# Patient Record
Sex: FEMALE | Race: WHITE | NOT HISPANIC OR LATINO | Employment: FULL TIME | URBAN - METROPOLITAN AREA
[De-identification: names, ages, dates, MRNs, and addresses within clinical notes are randomized per-mention and may not be internally consistent; named-entity substitution may affect disease eponyms.]

---

## 2017-01-05 ENCOUNTER — OFFICE VISIT (OUTPATIENT)
Dept: FAMILY MEDICINE CLINIC | Facility: CLINIC | Age: 62
End: 2017-01-05

## 2017-01-05 VITALS
OXYGEN SATURATION: 98 % | HEART RATE: 66 BPM | WEIGHT: 192 LBS | HEIGHT: 63 IN | SYSTOLIC BLOOD PRESSURE: 112 MMHG | DIASTOLIC BLOOD PRESSURE: 72 MMHG | BODY MASS INDEX: 34.02 KG/M2 | TEMPERATURE: 97.9 F

## 2017-01-05 DIAGNOSIS — J06.9 UPPER RESPIRATORY TRACT INFECTION, UNSPECIFIED TYPE: Primary | ICD-10-CM

## 2017-01-05 DIAGNOSIS — J01.00 SUBACUTE MAXILLARY SINUSITIS: ICD-10-CM

## 2017-01-05 PROCEDURE — 99213 OFFICE O/P EST LOW 20 MIN: CPT | Performed by: NURSE PRACTITIONER

## 2017-01-05 RX ORDER — AZITHROMYCIN 250 MG/1
TABLET, FILM COATED ORAL
Qty: 6 TABLET | Refills: 0 | Status: SHIPPED | OUTPATIENT
Start: 2017-01-05 | End: 2017-12-26

## 2017-01-05 NOTE — PROGRESS NOTES
Subjective   Idania Mccray is a 61 y.o. female.     History of Present Illness One week history of clear to cloudy nasal discharge, nasal congestion, ears are full, sinus pressure. No fever or cough. Denies SOB and wheezing. She has not taken any meds to treat this.     The following portions of the patient's history were reviewed and updated as appropriate: allergies, current medications, past family history, past medical history, past social history, past surgical history and problem list.    Review of Systems   Constitutional: Negative for fatigue, fever and unexpected weight change.   HENT: Positive for congestion, postnasal drip, rhinorrhea and sinus pressure. Negative for hearing loss, nosebleeds, sore throat, trouble swallowing and voice change.    Eyes: Negative for pain, discharge, redness and visual disturbance.   Respiratory: Positive for cough. Negative for chest tightness, shortness of breath and wheezing.    Cardiovascular: Negative for chest pain, palpitations and leg swelling.   Gastrointestinal: Negative for abdominal distention, abdominal pain, anal bleeding, blood in stool, constipation, diarrhea, nausea and vomiting.   Endocrine: Negative for cold intolerance, heat intolerance, polydipsia, polyphagia and polyuria.   Genitourinary: Negative for dysuria, flank pain, frequency and hematuria.   Musculoskeletal: Negative for arthralgias, gait problem, joint swelling and myalgias.   Skin: Negative for color change and rash.   Neurological: Negative for dizziness, tremors, seizures, syncope, speech difficulty, weakness, numbness and headaches.   Hematological: Negative.    Psychiatric/Behavioral: Negative.        Objective   Physical Exam   Constitutional: She is oriented to person, place, and time. She appears well-developed and well-nourished. No distress.   HENT:   Head: Normocephalic and atraumatic.   Right Ear: Tympanic membrane and external ear normal.   Left Ear: Tympanic membrane and external  ear normal.   Nose: Nose normal.   Mouth/Throat: Oropharynx is clear and moist. No oropharyngeal exudate.   Eyes: Conjunctivae are normal. Pupils are equal, round, and reactive to light. Right eye exhibits no discharge. Left eye exhibits no discharge. No scleral icterus.   Neck: Neck supple. No tracheal deviation present. No thyromegaly present.   Cardiovascular: Normal rate, regular rhythm and normal heart sounds.  Exam reveals no gallop and no friction rub.    No murmur heard.  Pulmonary/Chest: Effort normal and breath sounds normal. No respiratory distress. She has no wheezes.   Abdominal: Soft. Bowel sounds are normal. She exhibits no distension and no mass. There is no tenderness.   Musculoskeletal: She exhibits no edema or deformity.   Lymphadenopathy:     She has no cervical adenopathy.   Neurological: She is alert and oriented to person, place, and time. Coordination normal.   Skin: Skin is warm and dry. No rash noted. No erythema.   Psychiatric: She has a normal mood and affect. Her speech is normal and behavior is normal. Judgment and thought content normal.   Nursing note and vitals reviewed.      Assessment/Plan   Idania was seen today for sinusitis.    Diagnoses and all orders for this visit:    Upper respiratory tract infection, unspecified type  -     azithromycin (ZITHROMAX Z-SITA) 250 MG tablet; Take 2 tablets the first day, then 1 tablet daily for 4 days.    Subacute maxillary sinusitis    Symptomatic treatment. Follow up symptoms persist or worsen.

## 2017-03-01 ENCOUNTER — TELEPHONE (OUTPATIENT)
Dept: FAMILY MEDICINE CLINIC | Facility: CLINIC | Age: 62
End: 2017-03-01

## 2017-03-01 NOTE — TELEPHONE ENCOUNTER
----- Message from Brandon Duenas MD sent at 2/28/2017  5:27 PM EST -----  Contact: [phone]  Yes ok cmp lipids tsh  ----- Message -----     From: Dave Mann MA     Sent: 2/28/2017   9:54 AM       To: Brandon Duenas MD    She has an f/u appt on 3-21, do you want her to wait?    ----- Message -----     From: Raquel Santizo     Sent: 2/28/2017   9:10 AM       To: Dave Mann MA    PATIENT IS REQUESTING A CALL BACK FROM THE NURSE. PATIENT WOULD LIKE TO KNOW IF SHE CAN STILL RETURN FOR FASTING LABS WITHOUT AN APPOINTMENT (PT WAS SEEN IN NOVEMBER) TO GET HER REFILLS. MAY LEAVE A MESSAGE ON # ABOVE      Called informed pt.    MARILEE SANDERS

## 2017-03-16 ENCOUNTER — LAB (OUTPATIENT)
Dept: FAMILY MEDICINE CLINIC | Facility: CLINIC | Age: 62
End: 2017-03-16

## 2017-03-16 DIAGNOSIS — E78.01 FAMILIAL HYPERCHOLESTEROLEMIA: Primary | ICD-10-CM

## 2017-03-16 DIAGNOSIS — I10 ESSENTIAL HYPERTENSION: ICD-10-CM

## 2017-03-16 LAB
25(OH)D3 SERPL-MCNC: 32.9 NG/ML
ALBUMIN SERPL-MCNC: 4.2 G/DL (ref 3.2–4.8)
ALBUMIN/GLOB SERPL: 1.5 G/DL (ref 1.5–2.5)
ALP SERPL-CCNC: 92 U/L (ref 25–100)
ALT SERPL W P-5'-P-CCNC: 17 U/L (ref 7–40)
ANION GAP SERPL CALCULATED.3IONS-SCNC: 7 MMOL/L (ref 3–11)
ARTICHOKE IGE QN: 111 MG/DL (ref 0–130)
AST SERPL-CCNC: 16 U/L (ref 0–33)
BASOPHILS # BLD AUTO: 0.02 10*3/MM3 (ref 0–0.2)
BASOPHILS NFR BLD AUTO: 0.6 % (ref 0–1)
BILIRUB SERPL-MCNC: 0.6 MG/DL (ref 0.3–1.2)
BUN BLD-MCNC: 15 MG/DL (ref 9–23)
BUN/CREAT SERPL: 21.4 (ref 7–25)
CALCIUM SPEC-SCNC: 10.2 MG/DL (ref 8.7–10.4)
CHLORIDE SERPL-SCNC: 103 MMOL/L (ref 99–109)
CHOLEST SERPL-MCNC: 174 MG/DL (ref 0–200)
CO2 SERPL-SCNC: 30 MMOL/L (ref 20–31)
CREAT BLD-MCNC: 0.7 MG/DL (ref 0.6–1.3)
DEPRECATED RDW RBC AUTO: 46 FL (ref 37–54)
EOSINOPHIL # BLD AUTO: 0.05 10*3/MM3 (ref 0.1–0.3)
EOSINOPHIL NFR BLD AUTO: 1.4 % (ref 0–3)
ERYTHROCYTE [DISTWIDTH] IN BLOOD BY AUTOMATED COUNT: 14.2 % (ref 11.3–14.5)
GFR SERPL CREATININE-BSD FRML MDRD: 85 ML/MIN/1.73
GLOBULIN UR ELPH-MCNC: 2.8 GM/DL
GLUCOSE BLD-MCNC: 109 MG/DL (ref 70–100)
HCT VFR BLD AUTO: 38.2 % (ref 34.5–44)
HDLC SERPL-MCNC: 54 MG/DL (ref 40–60)
HGB BLD-MCNC: 12.4 G/DL (ref 11.5–15.5)
IMM GRANULOCYTES # BLD: 0.01 10*3/MM3 (ref 0–0.03)
IMM GRANULOCYTES NFR BLD: 0.3 % (ref 0–0.6)
LYMPHOCYTES # BLD AUTO: 1.17 10*3/MM3 (ref 0.6–4.8)
LYMPHOCYTES NFR BLD AUTO: 33.4 % (ref 24–44)
MCH RBC QN AUTO: 28.7 PG (ref 27–31)
MCHC RBC AUTO-ENTMCNC: 32.5 G/DL (ref 32–36)
MCV RBC AUTO: 88.4 FL (ref 80–99)
MONOCYTES # BLD AUTO: 0.35 10*3/MM3 (ref 0–1)
MONOCYTES NFR BLD AUTO: 10 % (ref 0–12)
NEUTROPHILS # BLD AUTO: 1.9 10*3/MM3 (ref 1.5–8.3)
NEUTROPHILS NFR BLD AUTO: 54.3 % (ref 41–71)
PLATELET # BLD AUTO: 164 10*3/MM3 (ref 150–450)
PMV BLD AUTO: 12.4 FL (ref 6–12)
POTASSIUM BLD-SCNC: 4.8 MMOL/L (ref 3.5–5.5)
PROT SERPL-MCNC: 7 G/DL (ref 5.7–8.2)
RBC # BLD AUTO: 4.32 10*6/MM3 (ref 3.89–5.14)
SODIUM BLD-SCNC: 140 MMOL/L (ref 132–146)
TRIGL SERPL-MCNC: 81 MG/DL (ref 0–150)
TSH SERPL DL<=0.05 MIU/L-ACNC: 0.01 MIU/ML (ref 0.35–5.35)
WBC NRBC COR # BLD: 3.5 10*3/MM3 (ref 3.5–10.8)

## 2017-03-16 PROCEDURE — 85025 COMPLETE CBC W/AUTO DIFF WBC: CPT | Performed by: FAMILY MEDICINE

## 2017-03-16 PROCEDURE — 36415 COLL VENOUS BLD VENIPUNCTURE: CPT | Performed by: FAMILY MEDICINE

## 2017-03-16 PROCEDURE — 82306 VITAMIN D 25 HYDROXY: CPT | Performed by: FAMILY MEDICINE

## 2017-03-16 PROCEDURE — 80053 COMPREHEN METABOLIC PANEL: CPT | Performed by: FAMILY MEDICINE

## 2017-03-16 PROCEDURE — 84443 ASSAY THYROID STIM HORMONE: CPT | Performed by: FAMILY MEDICINE

## 2017-03-16 PROCEDURE — 80061 LIPID PANEL: CPT | Performed by: FAMILY MEDICINE

## 2017-03-20 RX ORDER — ESCITALOPRAM OXALATE 10 MG/1
TABLET ORAL
Qty: 90 TABLET | Refills: 0 | Status: SHIPPED | OUTPATIENT
Start: 2017-03-20 | End: 2017-06-16 | Stop reason: SDUPTHER

## 2017-05-30 RX ORDER — LISINOPRIL AND HYDROCHLOROTHIAZIDE 20; 12.5 MG/1; MG/1
TABLET ORAL
Qty: 90 TABLET | Refills: 0 | Status: SHIPPED | OUTPATIENT
Start: 2017-05-30 | End: 2017-08-25 | Stop reason: SDUPTHER

## 2017-05-31 RX ORDER — LEVOTHYROXINE SODIUM 0.12 MG/1
TABLET ORAL
Qty: 90 TABLET | Refills: 0 | Status: SHIPPED | OUTPATIENT
Start: 2017-05-31 | End: 2017-12-26

## 2017-06-05 RX ORDER — LEVOTHYROXINE SODIUM 0.12 MG/1
TABLET ORAL
Qty: 90 TABLET | Refills: 1 | Status: SHIPPED | OUTPATIENT
Start: 2017-06-05 | End: 2017-11-25 | Stop reason: SDUPTHER

## 2017-06-15 RX ORDER — ESCITALOPRAM OXALATE 10 MG/1
TABLET ORAL
Qty: 90 TABLET | Refills: 0 | OUTPATIENT
Start: 2017-06-15

## 2017-06-16 RX ORDER — ESCITALOPRAM OXALATE 10 MG/1
TABLET ORAL
Qty: 90 TABLET | Refills: 0 | Status: SHIPPED | OUTPATIENT
Start: 2017-06-16 | End: 2017-09-13 | Stop reason: SDUPTHER

## 2017-06-27 RX ORDER — ATORVASTATIN CALCIUM 10 MG/1
TABLET, FILM COATED ORAL
Qty: 90 TABLET | Refills: 1 | Status: SHIPPED | OUTPATIENT
Start: 2017-06-27 | End: 2017-12-19 | Stop reason: SDUPTHER

## 2017-08-25 RX ORDER — LISINOPRIL AND HYDROCHLOROTHIAZIDE 20; 12.5 MG/1; MG/1
TABLET ORAL
Qty: 90 TABLET | Refills: 0 | Status: SHIPPED | OUTPATIENT
Start: 2017-08-25 | End: 2017-12-09 | Stop reason: SDUPTHER

## 2017-09-13 RX ORDER — ESCITALOPRAM OXALATE 10 MG/1
TABLET ORAL
Qty: 90 TABLET | Refills: 0 | Status: SHIPPED | OUTPATIENT
Start: 2017-09-13 | End: 2017-12-11 | Stop reason: SDUPTHER

## 2017-11-22 RX ORDER — LISINOPRIL AND HYDROCHLOROTHIAZIDE 20; 12.5 MG/1; MG/1
TABLET ORAL
Qty: 90 TABLET | Refills: 0 | OUTPATIENT
Start: 2017-11-22

## 2017-11-26 RX ORDER — LEVOTHYROXINE SODIUM 0.12 MG/1
TABLET ORAL
Qty: 30 TABLET | Refills: 0 | Status: SHIPPED | OUTPATIENT
Start: 2017-11-26 | End: 2018-01-09 | Stop reason: SDUPTHER

## 2017-12-11 RX ORDER — ESCITALOPRAM OXALATE 10 MG/1
TABLET ORAL
Qty: 30 TABLET | Refills: 0 | Status: SHIPPED | OUTPATIENT
Start: 2017-12-11 | End: 2018-01-09 | Stop reason: SDUPTHER

## 2017-12-11 RX ORDER — LISINOPRIL AND HYDROCHLOROTHIAZIDE 20; 12.5 MG/1; MG/1
TABLET ORAL
Qty: 30 TABLET | Refills: 0 | Status: SHIPPED | OUTPATIENT
Start: 2017-12-11 | End: 2018-01-08 | Stop reason: SDUPTHER

## 2017-12-19 RX ORDER — ATORVASTATIN CALCIUM 10 MG/1
TABLET, FILM COATED ORAL
Qty: 30 TABLET | Refills: 0 | Status: SHIPPED | OUTPATIENT
Start: 2017-12-19 | End: 2018-01-17 | Stop reason: SDUPTHER

## 2017-12-21 PROCEDURE — 87086 URINE CULTURE/COLONY COUNT: CPT | Performed by: FAMILY MEDICINE

## 2017-12-26 ENCOUNTER — OFFICE VISIT (OUTPATIENT)
Dept: FAMILY MEDICINE CLINIC | Facility: CLINIC | Age: 62
End: 2017-12-26

## 2017-12-26 VITALS
BODY MASS INDEX: 31.29 KG/M2 | TEMPERATURE: 98.4 F | OXYGEN SATURATION: 98 % | HEART RATE: 90 BPM | HEIGHT: 63 IN | WEIGHT: 176.6 LBS | RESPIRATION RATE: 16 BRPM | DIASTOLIC BLOOD PRESSURE: 72 MMHG | SYSTOLIC BLOOD PRESSURE: 126 MMHG

## 2017-12-26 DIAGNOSIS — Z13.1 DIABETES MELLITUS SCREENING: Primary | ICD-10-CM

## 2017-12-26 DIAGNOSIS — E11.9 CONTROLLED TYPE 2 DIABETES MELLITUS WITHOUT COMPLICATION, WITHOUT LONG-TERM CURRENT USE OF INSULIN (HCC): ICD-10-CM

## 2017-12-26 LAB
ALBUMIN SERPL-MCNC: 4.5 G/DL (ref 3.2–4.8)
ALBUMIN/GLOB SERPL: 1.7 G/DL (ref 1.5–2.5)
ALP SERPL-CCNC: 110 U/L (ref 25–100)
ALT SERPL W P-5'-P-CCNC: 24 U/L (ref 7–40)
ANION GAP SERPL CALCULATED.3IONS-SCNC: 11 MMOL/L (ref 3–11)
ARTICHOKE IGE QN: 147 MG/DL (ref 0–130)
AST SERPL-CCNC: 18 U/L (ref 0–33)
BASOPHILS # BLD AUTO: 0.02 10*3/MM3 (ref 0–0.2)
BASOPHILS NFR BLD AUTO: 0.4 % (ref 0–1)
BILIRUB SERPL-MCNC: 0.8 MG/DL (ref 0.3–1.2)
BUN BLD-MCNC: 22 MG/DL (ref 9–23)
BUN/CREAT SERPL: 24.4 (ref 7–25)
CALCIUM SPEC-SCNC: 10.5 MG/DL (ref 8.7–10.4)
CHLORIDE SERPL-SCNC: 98 MMOL/L (ref 99–109)
CHOLEST SERPL-MCNC: 199 MG/DL (ref 0–200)
CO2 SERPL-SCNC: 27 MMOL/L (ref 20–31)
CREAT BLD-MCNC: 0.9 MG/DL (ref 0.6–1.3)
DEPRECATED RDW RBC AUTO: 42.3 FL (ref 37–54)
EOSINOPHIL # BLD AUTO: 0.03 10*3/MM3 (ref 0–0.3)
EOSINOPHIL NFR BLD AUTO: 0.5 % (ref 0–3)
ERYTHROCYTE [DISTWIDTH] IN BLOOD BY AUTOMATED COUNT: 13.5 % (ref 11.3–14.5)
GFR SERPL CREATININE-BSD FRML MDRD: 63 ML/MIN/1.73
GLOBULIN UR ELPH-MCNC: 2.7 GM/DL
GLUCOSE BLD-MCNC: 315 MG/DL (ref 70–100)
HBA1C MFR BLD: 12.2 %
HCT VFR BLD AUTO: 43 % (ref 34.5–44)
HDLC SERPL-MCNC: 48 MG/DL (ref 40–60)
HGB BLD-MCNC: 14.1 G/DL (ref 11.5–15.5)
IMM GRANULOCYTES # BLD: 0.01 10*3/MM3 (ref 0–0.03)
IMM GRANULOCYTES NFR BLD: 0.2 % (ref 0–0.6)
LYMPHOCYTES # BLD AUTO: 1.22 10*3/MM3 (ref 0.6–4.8)
LYMPHOCYTES NFR BLD AUTO: 21.4 % (ref 24–44)
MCH RBC QN AUTO: 28.2 PG (ref 27–31)
MCHC RBC AUTO-ENTMCNC: 32.8 G/DL (ref 32–36)
MCV RBC AUTO: 86 FL (ref 80–99)
MONOCYTES # BLD AUTO: 0.43 10*3/MM3 (ref 0–1)
MONOCYTES NFR BLD AUTO: 7.5 % (ref 0–12)
NEUTROPHILS # BLD AUTO: 4 10*3/MM3 (ref 1.5–8.3)
NEUTROPHILS NFR BLD AUTO: 70 % (ref 41–71)
PLATELET # BLD AUTO: 175 10*3/MM3 (ref 150–450)
PMV BLD AUTO: 13.6 FL (ref 6–12)
POTASSIUM BLD-SCNC: 5.1 MMOL/L (ref 3.5–5.5)
PROT SERPL-MCNC: 7.2 G/DL (ref 5.7–8.2)
RBC # BLD AUTO: 5 10*6/MM3 (ref 3.89–5.14)
SODIUM BLD-SCNC: 136 MMOL/L (ref 132–146)
TRIGL SERPL-MCNC: 119 MG/DL (ref 0–150)
TSH SERPL DL<=0.05 MIU/L-ACNC: 0.01 MIU/ML (ref 0.35–5.35)
WBC NRBC COR # BLD: 5.71 10*3/MM3 (ref 3.5–10.8)

## 2017-12-26 PROCEDURE — 80053 COMPREHEN METABOLIC PANEL: CPT | Performed by: FAMILY MEDICINE

## 2017-12-26 PROCEDURE — 83036 HEMOGLOBIN GLYCOSYLATED A1C: CPT | Performed by: FAMILY MEDICINE

## 2017-12-26 PROCEDURE — 36415 COLL VENOUS BLD VENIPUNCTURE: CPT | Performed by: FAMILY MEDICINE

## 2017-12-26 PROCEDURE — 84443 ASSAY THYROID STIM HORMONE: CPT | Performed by: FAMILY MEDICINE

## 2017-12-26 PROCEDURE — 85025 COMPLETE CBC W/AUTO DIFF WBC: CPT | Performed by: FAMILY MEDICINE

## 2017-12-26 PROCEDURE — 80061 LIPID PANEL: CPT | Performed by: FAMILY MEDICINE

## 2017-12-26 PROCEDURE — 99214 OFFICE O/P EST MOD 30 MIN: CPT | Performed by: FAMILY MEDICINE

## 2017-12-26 RX ORDER — FLUCONAZOLE 150 MG/1
150 TABLET ORAL ONCE
Qty: 1 TABLET | Refills: 1 | Status: SHIPPED | OUTPATIENT
Start: 2017-12-26 | End: 2017-12-26

## 2017-12-26 RX ORDER — GLIPIZIDE AND METFORMIN HCL 2.5; 5 MG/1; MG/1
1 TABLET, FILM COATED ORAL
Qty: 60 TABLET | Refills: 1 | Status: SHIPPED | OUTPATIENT
Start: 2017-12-26 | End: 2018-01-04 | Stop reason: SDUPTHER

## 2017-12-27 NOTE — PROGRESS NOTES
"Subjective   Idania Mccray is a 62 y.o. female.     Diabetes   She presents for her initial (recent elevated glucose at urgent care) diabetic visit. She has type 2 diabetes mellitus. Her disease course has been fluctuating. Pertinent negatives for hypoglycemia include no dizziness, mood changes or speech difficulty. Associated symptoms include blurred vision, polydipsia, polyuria and weight loss. Pertinent negatives for diabetes include no chest pain, no fatigue, no foot paresthesias, no polyphagia and no weakness. There are no hypoglycemic complications. Symptoms are worsening. There are no diabetic complications. Risk factors for coronary artery disease include post-menopausal, obesity and diabetes mellitus. When asked about current treatments, none were reported. When asked about meal planning, she reported none. She rarely participates in exercise. An ACE inhibitor/angiotensin II receptor blocker is being taken. Eye exam is not current.        The following portions of the patient's history were reviewed and updated as appropriate: allergies, current medications, past social history and problem list.    Review of Systems   Constitutional: Positive for weight loss. Negative for appetite change, diaphoresis, fatigue and unexpected weight change.   HENT: Negative for congestion and sinus pain.    Eyes: Positive for blurred vision and visual disturbance.   Respiratory: Negative for chest tightness and shortness of breath.    Cardiovascular: Negative for chest pain, palpitations and leg swelling.   Gastrointestinal: Negative for diarrhea, nausea and vomiting.   Endocrine: Positive for polydipsia and polyuria. Negative for polyphagia.   Genitourinary: Positive for frequency.   Skin: Negative for color change.   Neurological: Negative for dizziness, speech difficulty, weakness, light-headedness and numbness.       Objective   /72  Pulse 90  Temp 98.4 °F (36.9 °C)  Resp 16  Ht 160 cm (63\")  Wt 80.1 kg (176 " lb 9.6 oz)  SpO2 98%  BMI 31.28 kg/m2  Physical Exam   Constitutional: She is oriented to person, place, and time. She appears well-developed and well-nourished. She is cooperative.   HENT:   Head: Normocephalic.   Right Ear: External ear normal.   Left Ear: External ear normal.   Nose: Nose normal.   Mouth/Throat: Oropharynx is clear and moist.   Eyes: Conjunctivae are normal. Pupils are equal, round, and reactive to light. No scleral icterus.   Neck: Neck supple. Carotid bruit is not present. No thyromegaly present.   Cardiovascular: Normal rate and regular rhythm.    Pulmonary/Chest: Effort normal and breath sounds normal.   Abdominal: There is no hepatosplenomegaly.   Musculoskeletal: Normal range of motion.   Neurological: She is alert and oriented to person, place, and time.   No focal deficits no lateralizing signs   Skin: Skin is warm and dry. No rash noted.   Psychiatric: She has a normal mood and affect. Cognition and memory are normal.   Nursing note and vitals reviewed.      Assessment/Plan   Problem List Items Addressed This Visit     None      Visit Diagnoses     Diabetes mellitus screening    -  Primary    Relevant Orders    POC Glycosylated Hemoglobin (Hb A1C) (Completed)    Controlled type 2 diabetes mellitus without complication, without long-term current use of insulin        Relevant Medications    glipiZIDE-metFORMIN (METAGLIP) 2.5-500 MG per tablet    Other Relevant Orders    Comprehensive Metabolic Panel (Completed)    TSH (Completed)    Lipid Panel (Completed)    CBC & Differential (Completed)    Ambulatory Referral to Diabetic Education    CBC Auto Differential (Completed)          New Medications Ordered This Visit   Medications   • glipiZIDE-metFORMIN (METAGLIP) 2.5-500 MG per tablet     Sig: Take 1 tablet by mouth 2 (Two) Times a Day Before Meals.     Dispense:  60 tablet     Refill:  1   • fluconazole (DIFLUCAN) 150 MG tablet     Sig: Take 1 tablet by mouth 1 (One) Time for 1 dose.      Dispense:  1 tablet     Refill:  1     Ok glucose meter and strip and diabetic classes avoid all sweets

## 2018-01-01 ENCOUNTER — APPOINTMENT (OUTPATIENT)
Dept: GENERAL RADIOLOGY | Facility: HOSPITAL | Age: 63
End: 2018-01-01

## 2018-01-01 ENCOUNTER — HOSPITAL ENCOUNTER (EMERGENCY)
Facility: HOSPITAL | Age: 63
Discharge: HOME | End: 2018-01-01
Attending: EMERGENCY MEDICINE | Admitting: EMERGENCY MEDICINE

## 2018-01-01 VITALS
TEMPERATURE: 98.5 F | WEIGHT: 178 LBS | BODY MASS INDEX: 32.76 KG/M2 | RESPIRATION RATE: 17 BRPM | OXYGEN SATURATION: 98 % | HEIGHT: 62 IN | HEART RATE: 67 BPM | DIASTOLIC BLOOD PRESSURE: 66 MMHG | SYSTOLIC BLOOD PRESSURE: 108 MMHG

## 2018-01-01 DIAGNOSIS — R51.9 ACUTE NONINTRACTABLE HEADACHE, UNSPECIFIED HEADACHE TYPE: ICD-10-CM

## 2018-01-01 DIAGNOSIS — R11.2 NON-INTRACTABLE VOMITING WITH NAUSEA, UNSPECIFIED VOMITING TYPE: ICD-10-CM

## 2018-01-01 DIAGNOSIS — R73.9 HYPERGLYCEMIA: Primary | ICD-10-CM

## 2018-01-01 LAB
ALBUMIN SERPL-MCNC: 4.6 G/DL (ref 3.2–4.8)
ALBUMIN/GLOB SERPL: 1.4 G/DL (ref 1.5–2.5)
ALP SERPL-CCNC: 100 U/L (ref 25–100)
ALT SERPL W P-5'-P-CCNC: 28 U/L (ref 7–40)
ANION GAP SERPL CALCULATED.3IONS-SCNC: 11 MMOL/L (ref 3–11)
AST SERPL-CCNC: 27 U/L (ref 0–33)
BASOPHILS # BLD AUTO: 0.03 10*3/MM3 (ref 0–0.2)
BASOPHILS NFR BLD AUTO: 0.5 % (ref 0–1)
BILIRUB SERPL-MCNC: 0.7 MG/DL (ref 0.3–1.2)
BILIRUB UR QL STRIP: NEGATIVE
BUN BLD-MCNC: 17 MG/DL (ref 9–23)
BUN/CREAT SERPL: 18.9 (ref 7–25)
CALCIUM SPEC-SCNC: 10.4 MG/DL (ref 8.7–10.4)
CHLORIDE SERPL-SCNC: 97 MMOL/L (ref 99–109)
CLARITY UR: CLEAR
CO2 SERPL-SCNC: 27 MMOL/L (ref 20–31)
COLOR UR: YELLOW
CREAT BLD-MCNC: 0.9 MG/DL (ref 0.6–1.3)
DEPRECATED RDW RBC AUTO: 42.4 FL (ref 37–54)
EOSINOPHIL # BLD AUTO: 0.04 10*3/MM3 (ref 0–0.3)
EOSINOPHIL NFR BLD AUTO: 0.6 % (ref 0–3)
ERYTHROCYTE [DISTWIDTH] IN BLOOD BY AUTOMATED COUNT: 13.7 % (ref 11.3–14.5)
GFR SERPL CREATININE-BSD FRML MDRD: 63 ML/MIN/1.73
GLOBULIN UR ELPH-MCNC: 3.4 GM/DL
GLUCOSE BLD-MCNC: 228 MG/DL (ref 70–100)
GLUCOSE BLDC GLUCOMTR-MCNC: 228 MG/DL (ref 70–130)
GLUCOSE UR STRIP-MCNC: ABNORMAL MG/DL
HCT VFR BLD AUTO: 43 % (ref 34.5–44)
HGB BLD-MCNC: 14 G/DL (ref 11.5–15.5)
HGB UR QL STRIP.AUTO: NEGATIVE
HOLD SPECIMEN: NORMAL
HOLD SPECIMEN: NORMAL
IMM GRANULOCYTES # BLD: 0.01 10*3/MM3 (ref 0–0.03)
IMM GRANULOCYTES NFR BLD: 0.2 % (ref 0–0.6)
KETONES UR QL STRIP: ABNORMAL
LEUKOCYTE ESTERASE UR QL STRIP.AUTO: NEGATIVE
LYMPHOCYTES # BLD AUTO: 1.33 10*3/MM3 (ref 0.6–4.8)
LYMPHOCYTES NFR BLD AUTO: 21.1 % (ref 24–44)
MCH RBC QN AUTO: 27.9 PG (ref 27–31)
MCHC RBC AUTO-ENTMCNC: 32.6 G/DL (ref 32–36)
MCV RBC AUTO: 85.8 FL (ref 80–99)
MONOCYTES # BLD AUTO: 0.47 10*3/MM3 (ref 0–1)
MONOCYTES NFR BLD AUTO: 7.4 % (ref 0–12)
NEUTROPHILS # BLD AUTO: 4.43 10*3/MM3 (ref 1.5–8.3)
NEUTROPHILS NFR BLD AUTO: 70.2 % (ref 41–71)
NITRITE UR QL STRIP: NEGATIVE
PH UR STRIP.AUTO: 6 [PH] (ref 5–8)
PLATELET # BLD AUTO: 191 10*3/MM3 (ref 150–450)
PMV BLD AUTO: 13.5 FL (ref 6–12)
POTASSIUM BLD-SCNC: 3.8 MMOL/L (ref 3.5–5.5)
PROT SERPL-MCNC: 8 G/DL (ref 5.7–8.2)
PROT UR QL STRIP: NEGATIVE
RBC # BLD AUTO: 5.01 10*6/MM3 (ref 3.89–5.14)
SODIUM BLD-SCNC: 135 MMOL/L (ref 132–146)
SP GR UR STRIP: 1.02 (ref 1–1.03)
UROBILINOGEN UR QL STRIP: ABNORMAL
WBC NRBC COR # BLD: 6.31 10*3/MM3 (ref 3.5–10.8)
WHOLE BLOOD HOLD SPECIMEN: NORMAL
WHOLE BLOOD HOLD SPECIMEN: NORMAL

## 2018-01-01 PROCEDURE — 96360 HYDRATION IV INFUSION INIT: CPT

## 2018-01-01 PROCEDURE — 85025 COMPLETE CBC W/AUTO DIFF WBC: CPT | Performed by: EMERGENCY MEDICINE

## 2018-01-01 PROCEDURE — 80053 COMPREHEN METABOLIC PANEL: CPT | Performed by: EMERGENCY MEDICINE

## 2018-01-01 PROCEDURE — 99284 EMERGENCY DEPT VISIT MOD MDM: CPT

## 2018-01-01 PROCEDURE — 82962 GLUCOSE BLOOD TEST: CPT

## 2018-01-01 PROCEDURE — 71046 X-RAY EXAM CHEST 2 VIEWS: CPT

## 2018-01-01 PROCEDURE — 81003 URINALYSIS AUTO W/O SCOPE: CPT | Performed by: EMERGENCY MEDICINE

## 2018-01-01 RX ORDER — ONDANSETRON 4 MG/1
4 TABLET, FILM COATED ORAL EVERY 6 HOURS PRN
Qty: 15 TABLET | Refills: 0 | Status: SHIPPED | OUTPATIENT
Start: 2018-01-01 | End: 2019-01-02

## 2018-01-01 RX ORDER — ASPIRIN 81 MG/1
81 TABLET ORAL DAILY
COMMUNITY

## 2018-01-01 RX ORDER — SODIUM CHLORIDE 0.9 % (FLUSH) 0.9 %
10 SYRINGE (ML) INJECTION AS NEEDED
Status: DISCONTINUED | OUTPATIENT
Start: 2018-01-01 | End: 2018-01-01 | Stop reason: HOSPADM

## 2018-01-01 RX ORDER — ACETAMINOPHEN 500 MG
1000 TABLET ORAL ONCE
Status: COMPLETED | OUTPATIENT
Start: 2018-01-01 | End: 2018-01-01

## 2018-01-01 RX ORDER — ONDANSETRON 4 MG/1
4 TABLET, ORALLY DISINTEGRATING ORAL EVERY 6 HOURS PRN
Qty: 15 TABLET | Refills: 0 | Status: SHIPPED | OUTPATIENT
Start: 2018-01-01 | End: 2018-01-09 | Stop reason: SDUPTHER

## 2018-01-01 RX ADMIN — SODIUM CHLORIDE 1000 ML: 9 INJECTION, SOLUTION INTRAVENOUS at 12:50

## 2018-01-01 RX ADMIN — ACETAMINOPHEN 1000 MG: 500 TABLET ORAL at 12:51

## 2018-01-01 NOTE — DISCHARGE INSTRUCTIONS
Follow up with Dr. Brandon Duenas in the next week for recheck.     Immediately return to the emergency department if you develop new or worsening symptoms.    Please review the medications you are supposed to be taking according to prior physician recommendations. I have not changed your home medications during this visit. If your discharge instructions indicate that I have changed your home medications, this is not the case, and you should disregard. If you have any questions about the medication you should be taking at home, please call your physician.

## 2018-01-01 NOTE — ED PROVIDER NOTES
Subjective   HPI Comments: 62-year-old female presents complaining of headache, nausea, and poor appetite.  She states that approximately 2 weeks ago, she was diagnosed with type 2 diabetes.  Last week she was started on metformin and glipizide with which she has been compliant.  However over the past few days, she has had the aforementioned symptoms and attributes them to her new medication.  She denies any chest pain.  She denies any shortness of breath.  No cough or fevers.  No sick contacts.  No diarrhea.  No thunderclap onset.  No neck pain or stiffness.  No IV drug use.    Patient is a 62 y.o. female presenting with diabetes.   History provided by:  Patient  Blood Sugar Problem   Blood sugar level PTA:  200's  Severity:  Severe  Onset quality:  Gradual  Duration:  8 days  Timing:  Constant  Progression:  Unchanged  Chronicity:  New  Diabetes status:  Controlled with oral medications  Current diabetic therapy:  Glipizide and Metformin   Context: new diabetes diagnosis (eight days ago)    Relieved by:  Nothing  Ineffective treatments:  Oral agents  Associated symptoms: fatigue, nausea and weakness    Associated symptoms: no abdominal pain, no chest pain, no dizziness, no dysuria, no fever, no increased thirst, no shortness of breath and no vomiting        Review of Systems   Constitutional: Positive for appetite change and fatigue. Negative for chills and fever.   HENT: Negative for congestion, rhinorrhea, sore throat and trouble swallowing.    Respiratory: Negative for cough and shortness of breath.    Cardiovascular: Negative for chest pain.   Gastrointestinal: Positive for nausea. Negative for abdominal pain, diarrhea and vomiting.   Endocrine: Negative for polydipsia.   Genitourinary: Negative for difficulty urinating, dysuria, frequency, hematuria and urgency.   Musculoskeletal: Negative for back pain and neck pain.   Neurological: Positive for weakness and headaches. Negative for dizziness.   All other  systems reviewed and are negative.      Past Medical History:   Diagnosis Date   • Depression    • Diabetes mellitus    • Disease of thyroid gland    • Hyperlipidemia    • Hypertension        Allergies   Allergen Reactions   • Augmentin [Amoxicillin-Pot Clavulanate] Diarrhea       Past Surgical History:   Procedure Laterality Date   •  SECTION     • TONSILLECTOMY         History reviewed. No pertinent family history.    Social History     Social History   • Marital status:      Spouse name: N/A   • Number of children: N/A   • Years of education: N/A     Social History Main Topics   • Smoking status: Never Smoker   • Smokeless tobacco: Never Used   • Alcohol use No   • Drug use: No   • Sexual activity: Yes     Other Topics Concern   • None     Social History Narrative   • None         Objective   Physical Exam   Constitutional: She is oriented to person, place, and time. She appears well-developed and well-nourished. No distress.   HENT:   Head: Normocephalic and atraumatic.   Mouth/Throat: Oropharynx is clear and moist.   Moist mucous membranes; no mucous membrane lesions   Eyes: EOM are normal. Pupils are equal, round, and reactive to light.   Neck: Normal range of motion. Neck supple.   No meningeal signs   Cardiovascular: Normal rate, regular rhythm and normal heart sounds.  Exam reveals no gallop and no friction rub.    No murmur heard.  Pulmonary/Chest: Effort normal and breath sounds normal. No respiratory distress. She has no wheezes. She has no rales.   Abdominal: Soft. Bowel sounds are normal. She exhibits no distension and no mass. There is no tenderness. There is no rebound and no guarding.   Musculoskeletal: Normal range of motion.   Neurological: She is alert and oriented to person, place, and time. No cranial nerve deficit. Coordination normal.   Skin: Skin is warm and dry. No rash noted. She is not diaphoretic. No erythema.   Psychiatric: She has a normal mood and affect. Judgment and  "thought content normal.   Nursing note and vitals reviewed.      Procedures         ED Course  ED Course   Comment By Time   62-year-old female presents complaining of headache, nausea, and poor by mouth intake.  Of note, she states that she was just diagnosed with type 2 diabetes 2 weeks ago and has been compliant with her newly prescribed glipizide and metformin for the past week.  On arrival to the ED, patient very well-appearing with unremarkable exam.  Nonsurgical abdomen.  No red flag headache signs, symptoms, or history necessitating emergent neuro imaging.  We'll provide IV fluids and obtain labs and reassess after initial interventions. David Stratton MD 01/01 1248   Chest x-ray negative.  Labs remarkable only for mild hyperglycemia but otherwise unrevealing.  Upon reevaluation following IV fluids, patient improved and tolerating by mouth.  Prescription for Zofran when necessary.  She will follow-up with her primary care physician within one week.  Agreeable with plan and given appropriate return precautions. David Stratton MD 01/01 1356     No results found for this or any previous visit (from the past 24 hour(s)).  Note: In addition to lab results from this visit, the labs listed above may include labs taken at another facility or during a different encounter within the last 24 hours. Please correlate lab times with ED admission and discharge times for further clarification of the services performed during this visit.    No orders to display     Vitals:    01/01/18 1236   BP: 138/75   BP Location: Left arm   Patient Position: Lying   Pulse: 86   Resp: 18   Temp: 98.5 °F (36.9 °C)   TempSrc: Oral   SpO2: 97%   Weight: 80.7 kg (178 lb)   Height: 157.5 cm (62\")     Medications   sodium chloride 0.9 % flush 10 mL (not administered)     ECG/EMG Results (last 24 hours)     ** No results found for the last 24 hours. **                    Recent Results (from the past 24 hour(s))   POC Glucose Once    " Collection Time: 01/01/18 12:40 PM   Result Value Ref Range    Glucose 228 (H) 70 - 130 mg/dL   Light Blue Top    Collection Time: 01/01/18 12:41 PM   Result Value Ref Range    Extra Tube hold for add-on    Green Top (Gel)    Collection Time: 01/01/18 12:41 PM   Result Value Ref Range    Extra Tube Hold for add-ons.    Lavender Top    Collection Time: 01/01/18 12:41 PM   Result Value Ref Range    Extra Tube hold for add-on    Gold Top - SST    Collection Time: 01/01/18 12:41 PM   Result Value Ref Range    Extra Tube Hold for add-ons.    Comprehensive Metabolic Panel    Collection Time: 01/01/18 12:41 PM   Result Value Ref Range    Glucose 228 (H) 70 - 100 mg/dL    BUN 17 9 - 23 mg/dL    Creatinine 0.90 0.60 - 1.30 mg/dL    Sodium 135 132 - 146 mmol/L    Potassium 3.8 3.5 - 5.5 mmol/L    Chloride 97 (L) 99 - 109 mmol/L    CO2 27.0 20.0 - 31.0 mmol/L    Calcium 10.4 8.7 - 10.4 mg/dL    Total Protein 8.0 5.7 - 8.2 g/dL    Albumin 4.60 3.20 - 4.80 g/dL    ALT (SGPT) 28 7 - 40 U/L    AST (SGOT) 27 0 - 33 U/L    Alkaline Phosphatase 100 25 - 100 U/L    Total Bilirubin 0.7 0.3 - 1.2 mg/dL    eGFR Non African Amer 63 >60 mL/min/1.73    Globulin 3.4 gm/dL    A/G Ratio 1.4 (L) 1.5 - 2.5 g/dL    BUN/Creatinine Ratio 18.9 7.0 - 25.0    Anion Gap 11.0 3.0 - 11.0 mmol/L   CBC Auto Differential    Collection Time: 01/01/18 12:41 PM   Result Value Ref Range    WBC 6.31 3.50 - 10.80 10*3/mm3    RBC 5.01 3.89 - 5.14 10*6/mm3    Hemoglobin 14.0 11.5 - 15.5 g/dL    Hematocrit 43.0 34.5 - 44.0 %    MCV 85.8 80.0 - 99.0 fL    MCH 27.9 27.0 - 31.0 pg    MCHC 32.6 32.0 - 36.0 g/dL    RDW 13.7 11.3 - 14.5 %    RDW-SD 42.4 37.0 - 54.0 fl    MPV 13.5 (H) 6.0 - 12.0 fL    Platelets 191 150 - 450 10*3/mm3    Neutrophil % 70.2 41.0 - 71.0 %    Lymphocyte % 21.1 (L) 24.0 - 44.0 %    Monocyte % 7.4 0.0 - 12.0 %    Eosinophil % 0.6 0.0 - 3.0 %    Basophil % 0.5 0.0 - 1.0 %    Immature Grans % 0.2 0.0 - 0.6 %    Neutrophils, Absolute 4.43 1.50 -  "8.30 10*3/mm3    Lymphocytes, Absolute 1.33 0.60 - 4.80 10*3/mm3    Monocytes, Absolute 0.47 0.00 - 1.00 10*3/mm3    Eosinophils, Absolute 0.04 0.00 - 0.30 10*3/mm3    Basophils, Absolute 0.03 0.00 - 0.20 10*3/mm3    Immature Grans, Absolute 0.01 0.00 - 0.03 10*3/mm3   Urinalysis With / Culture If Indicated - Urine, Clean Catch    Collection Time: 01/01/18 12:51 PM   Result Value Ref Range    Color, UA Yellow Yellow, Straw    Appearance, UA Clear Clear    pH, UA 6.0 5.0 - 8.0    Specific Gravity, UA 1.022 1.001 - 1.030    Glucose,  mg/dL (2+) (A) Negative    Ketones, UA Trace (A) Negative    Bilirubin, UA Negative Negative    Blood, UA Negative Negative    Protein, UA Negative Negative    Leuk Esterase, UA Negative Negative    Nitrite, UA Negative Negative    Urobilinogen, UA 1.0 E.U./dL 0.2 - 1.0 E.U./dL     Note: In addition to lab results from this visit, the labs listed above may include labs taken at another facility or during a different encounter within the last 24 hours. Please correlate lab times with ED admission and discharge times for further clarification of the services performed during this visit.    XR Chest 2 View   Preliminary Result   No acute cardiopulmonary process.       D:  01/01/2018   E:  01/01/2018            Vitals:    01/01/18 1236 01/01/18 1335   BP: 138/75 113/68   BP Location: Left arm    Patient Position: Lying    Pulse: 86 85   Resp: 18    Temp: 98.5 °F (36.9 °C)    TempSrc: Oral    SpO2: 97% 96%   Weight: 80.7 kg (178 lb)    Height: 157.5 cm (62\")      Medications   sodium chloride 0.9 % flush 10 mL (not administered)   sodium chloride 0.9 % flush 10 mL (not administered)   sodium chloride 0.9 % bolus 1,000 mL (1,000 mL Intravenous New Bag 1/1/18 1250)   acetaminophen (TYLENOL) tablet 1,000 mg (1,000 mg Oral Given 1/1/18 1251)     ECG/EMG Results (last 24 hours)     ** No results found for the last 24 hours. **            MDM    Final diagnoses:   Hyperglycemia "   Non-intractable vomiting with nausea, unspecified vomiting type   Acute nonintractable headache, unspecified headache type       Documentation assistance provided by jm Curtis.  Information recorded by the scribe was done at my direction and has been verified and validated by me.     Melanie Curtis  01/01/18 1248       David Stratton MD  01/01/18 7278

## 2018-01-04 ENCOUNTER — OFFICE VISIT (OUTPATIENT)
Dept: FAMILY MEDICINE CLINIC | Facility: CLINIC | Age: 63
End: 2018-01-04

## 2018-01-04 VITALS
RESPIRATION RATE: 16 BRPM | BODY MASS INDEX: 32.13 KG/M2 | OXYGEN SATURATION: 98 % | WEIGHT: 174.6 LBS | DIASTOLIC BLOOD PRESSURE: 82 MMHG | HEIGHT: 62 IN | HEART RATE: 92 BPM | SYSTOLIC BLOOD PRESSURE: 122 MMHG

## 2018-01-04 DIAGNOSIS — E11.9 CONTROLLED TYPE 2 DIABETES MELLITUS WITHOUT COMPLICATION, WITHOUT LONG-TERM CURRENT USE OF INSULIN (HCC): ICD-10-CM

## 2018-01-04 DIAGNOSIS — I10 ESSENTIAL HYPERTENSION: Primary | ICD-10-CM

## 2018-01-04 PROCEDURE — 99213 OFFICE O/P EST LOW 20 MIN: CPT | Performed by: FAMILY MEDICINE

## 2018-01-04 RX ORDER — GLIPIZIDE AND METFORMIN HCL 2.5; 5 MG/1; MG/1
TABLET, FILM COATED ORAL
Qty: 90 TABLET | Refills: 2 | Status: SHIPPED | OUTPATIENT
Start: 2018-01-04 | End: 2018-01-04

## 2018-01-04 RX ORDER — BLOOD-GLUCOSE METER
KIT MISCELLANEOUS
Refills: 0 | COMMUNITY
Start: 2017-12-30

## 2018-01-04 RX ORDER — GLIPIZIDE AND METFORMIN HCL 2.5; 5 MG/1; MG/1
TABLET, FILM COATED ORAL
Qty: 90 TABLET | Refills: 2 | Status: SHIPPED | OUTPATIENT
Start: 2018-01-04 | End: 2018-01-05 | Stop reason: SDUPTHER

## 2018-01-04 NOTE — PROGRESS NOTES
Subjective   Idania Mccray is a 62 y.o. female.     Diabetes   She presents for her follow-up diabetic visit. She has type 2 diabetes mellitus. Her disease course has been improving. Hypoglycemia symptoms include sleepiness. Pertinent negatives for hypoglycemia include no dizziness or mood changes. Associated symptoms include blurred vision and polyuria. Pertinent negatives for diabetes include no chest pain, no fatigue, no polydipsia, no polyphagia and no weakness. There are no hypoglycemic complications. There are no diabetic complications. Risk factors for coronary artery disease include diabetes mellitus, sedentary lifestyle and post-menopausal. She is compliant with treatment most of the time. An ACE inhibitor/angiotensin II receptor blocker is being taken.   Hypertension   This is a chronic problem. The problem is unchanged. The problem is controlled. Associated symptoms include blurred vision. Pertinent negatives include no chest pain, palpitations or shortness of breath. Past treatments include ACE inhibitors and diuretics. The current treatment provides significant improvement. There are no compliance problems.       Glucose testing is showing a.m. blood sugar in the 200-220 range.  She feels Tired and Weak.  The following portions of the patient's history were reviewed and updated as appropriate: allergies, current medications, past social history and problem list.    Review of Systems   Constitutional: Negative for appetite change, diaphoresis, fatigue and unexpected weight change.   HENT: Negative for congestion and sneezing.    Eyes: Positive for blurred vision. Negative for visual disturbance.   Respiratory: Negative for chest tightness and shortness of breath.    Cardiovascular: Negative for chest pain, palpitations and leg swelling.   Gastrointestinal: Negative for diarrhea, nausea and vomiting.   Endocrine: Positive for polyuria. Negative for polydipsia and polyphagia.   Genitourinary: Positive  "for frequency. Negative for dysuria.   Musculoskeletal: Negative for arthralgias and myalgias.   Skin: Negative for color change.   Neurological: Negative for dizziness, weakness, light-headedness and numbness.       Objective   /82  Pulse 92  Resp 16  Ht 157.5 cm (62\")  Wt 79.2 kg (174 lb 9.6 oz)  SpO2 98%  BMI 31.93 kg/m2  Physical Exam   Constitutional: She is oriented to person, place, and time. She appears well-developed and well-nourished. She is cooperative.   HENT:   Head: Normocephalic.   Right Ear: External ear normal.   Left Ear: External ear normal.   Nose: Nose normal.   Mouth/Throat: Oropharynx is clear and moist.   Eyes: Conjunctivae are normal. Pupils are equal, round, and reactive to light. No scleral icterus.   Neck: Neck supple. Carotid bruit is not present. No thyromegaly present.   Cardiovascular: Normal rate, regular rhythm and normal heart sounds.    Pulmonary/Chest: Effort normal and breath sounds normal.   Abdominal: There is no hepatosplenomegaly.   Musculoskeletal: Normal range of motion. She exhibits no edema.   Neurological: She is alert and oriented to person, place, and time.   No focal deficits no lateralizing signs   Skin: Skin is warm and dry. No rash noted.   Psychiatric: She has a normal mood and affect. Cognition and memory are normal.   Nursing note and vitals reviewed.      Assessment/Plan   Problem List Items Addressed This Visit     Hypertension - Primary    Controlled type 2 diabetes mellitus without complication, without long-term current use of insulin    Relevant Medications    glipiZIDE-metFORMIN (METAGLIP) 2.5-500 MG per tablet          New Medications Ordered This Visit   Medications   • Blood Glucose Monitoring Suppl (ONE TOUCH ULTRA MINI) w/Device kit     Sig: USE AS DIRECTED [E11.9]     Refill:  0   • ONE TOUCH ULTRA TEST test strip     Sig: USE TO TEST TWICE A DAY [E11.9]     Refill:  5   • ONETOUCH DELICA LANCETS FINE misc     Sig: USE TO TEST TWICE A " DAY [E11.9]     Refill:  5   • glipiZIDE-metFORMIN (METAGLIP) 2.5-500 MG per tablet     Sig: Take 2 am and 1 pm     Dispense:  90 tablet     Refill:  2     Keep appointment for diabetic teaching classes.  Increase glipizide/metformin to 2 morning and one evening

## 2018-01-05 RX ORDER — GLIPIZIDE AND METFORMIN HCL 2.5; 5 MG/1; MG/1
TABLET, FILM COATED ORAL
Qty: 90 TABLET | Refills: 2 | Status: SHIPPED | OUTPATIENT
Start: 2018-01-05 | End: 2018-01-12 | Stop reason: SINTOL

## 2018-01-08 RX ORDER — LISINOPRIL AND HYDROCHLOROTHIAZIDE 20; 12.5 MG/1; MG/1
TABLET ORAL
Qty: 30 TABLET | Refills: 5 | Status: SHIPPED | OUTPATIENT
Start: 2018-01-08 | End: 2018-12-05

## 2018-01-09 ENCOUNTER — OFFICE VISIT (OUTPATIENT)
Dept: FAMILY MEDICINE CLINIC | Facility: CLINIC | Age: 63
End: 2018-01-09

## 2018-01-09 VITALS
WEIGHT: 176 LBS | SYSTOLIC BLOOD PRESSURE: 124 MMHG | HEIGHT: 62 IN | OXYGEN SATURATION: 98 % | RESPIRATION RATE: 16 BRPM | HEART RATE: 80 BPM | DIASTOLIC BLOOD PRESSURE: 78 MMHG | BODY MASS INDEX: 32.39 KG/M2

## 2018-01-09 DIAGNOSIS — J01.00 ACUTE MAXILLARY SINUSITIS, RECURRENCE NOT SPECIFIED: Primary | ICD-10-CM

## 2018-01-09 DIAGNOSIS — E11.9 CONTROLLED TYPE 2 DIABETES MELLITUS WITHOUT COMPLICATION, WITHOUT LONG-TERM CURRENT USE OF INSULIN (HCC): ICD-10-CM

## 2018-01-09 DIAGNOSIS — B34.9 VIRAL SYNDROME: ICD-10-CM

## 2018-01-09 LAB
EXPIRATION DATE: NORMAL
FLUAV AG NPH QL: NEGATIVE
FLUBV AG NPH QL: NEGATIVE
INTERNAL CONTROL: NORMAL
Lab: NORMAL

## 2018-01-09 PROCEDURE — 87804 INFLUENZA ASSAY W/OPTIC: CPT | Performed by: FAMILY MEDICINE

## 2018-01-09 PROCEDURE — 99213 OFFICE O/P EST LOW 20 MIN: CPT | Performed by: FAMILY MEDICINE

## 2018-01-09 RX ORDER — ONDANSETRON 4 MG/1
4 TABLET, ORALLY DISINTEGRATING ORAL EVERY 6 HOURS PRN
Qty: 15 TABLET | Refills: 0 | Status: SHIPPED | OUTPATIENT
Start: 2018-01-09 | End: 2018-02-28 | Stop reason: SDUPTHER

## 2018-01-09 RX ORDER — LEVOTHYROXINE SODIUM 0.12 MG/1
125 TABLET ORAL DAILY
Qty: 90 TABLET | Refills: 1 | Status: SHIPPED | OUTPATIENT
Start: 2018-01-09 | End: 2018-06-25 | Stop reason: SDUPTHER

## 2018-01-09 RX ORDER — ESCITALOPRAM OXALATE 10 MG/1
10 TABLET ORAL DAILY
Qty: 30 TABLET | Refills: 2 | Status: SHIPPED | OUTPATIENT
Start: 2018-01-09 | End: 2018-06-25 | Stop reason: SDUPTHER

## 2018-01-10 NOTE — PROGRESS NOTES
Subjective   Idania Mccray is a 62 y.o. female.     Anxiety   Presents for initial visit. Onset was in the past 7 days. The problem has been gradually worsening. Symptoms include dizziness, nervous/anxious behavior and panic. Patient reports no chest pain, compulsions, feeling of choking, nausea, palpitations or shortness of breath. Symptoms occur most days. The severity of symptoms is causing significant distress. The quality of sleep is fair. Nighttime awakenings: occasional.     Risk factors include change in medication. There is no history of anxiety/panic attacks, bipolar disorder or depression. Past treatments include SSRIs.   Nausea   This is a recurrent problem. The current episode started in the past 7 days. The problem occurs intermittently. The problem has been unchanged. Pertinent negatives include no chest pain, congestion, diaphoresis, fatigue, nausea, numbness, vomiting or weakness.   Dizziness   This is a new problem. The current episode started in the past 7 days. The problem has been unchanged. Pertinent negatives include no chest pain, congestion, diaphoresis, fatigue, nausea, numbness, vomiting or weakness. She has tried nothing for the symptoms. The treatment provided no relief.   Diabetes   She presents for her follow-up diabetic visit. She has type 2 diabetes mellitus. Her disease course has been improving. Hypoglycemia symptoms include dizziness and nervousness/anxiousness. Associated symptoms include blurred vision and polyuria. Pertinent negatives for diabetes include no chest pain, no fatigue, no polydipsia, no polyphagia and no weakness. There are no hypoglycemic complications. There are no diabetic complications. Current diabetic treatment includes oral agent (dual therapy). Her home blood glucose trend is decreasing steadily. An ACE inhibitor/angiotensin II receptor blocker is being taken.      Feeling weak and anxious nauseous and unable to concentrate at work and difficult to focus  "vision  The following portions of the patient's history were reviewed and updated as appropriate: allergies, current medications, past social history and problem list.    Review of Systems   Constitutional: Negative for appetite change, diaphoresis, fatigue and unexpected weight change.   HENT: Negative for congestion and postnasal drip.    Eyes: Positive for blurred vision and visual disturbance.   Respiratory: Negative for chest tightness and shortness of breath.    Cardiovascular: Negative for chest pain, palpitations and leg swelling.   Gastrointestinal: Negative for diarrhea, nausea and vomiting.   Endocrine: Positive for polyuria. Negative for polydipsia and polyphagia.   Genitourinary: Negative for dysuria and hematuria.   Skin: Negative for color change.   Neurological: Positive for dizziness. Negative for weakness, light-headedness and numbness.   Psychiatric/Behavioral: The patient is nervous/anxious.        Objective   /78  Pulse 80  Resp 16  Ht 157.5 cm (62\")  Wt 79.8 kg (176 lb)  SpO2 98%  BMI 32.19 kg/m2  Physical Exam   Constitutional: She is oriented to person, place, and time. She appears well-developed and well-nourished. She is cooperative.   HENT:   Head: Normocephalic.   Right Ear: External ear normal.   Left Ear: External ear normal.   Nose: Nose normal.   Mouth/Throat: Oropharynx is clear and moist.   Eyes: Conjunctivae are normal. Pupils are equal, round, and reactive to light. No scleral icterus.   Neck: Neck supple. Carotid bruit is not present. No thyromegaly present.   Cardiovascular: Normal rate, regular rhythm and normal heart sounds.    Pulmonary/Chest: Effort normal and breath sounds normal.   Abdominal: There is no hepatosplenomegaly.   Musculoskeletal: Normal range of motion. She exhibits no edema.   Lymphadenopathy:     She has no cervical adenopathy.   Neurological: She is alert and oriented to person, place, and time.   No focal deficits no lateralizing signs   Skin: " Skin is warm and dry. No rash noted.   Psychiatric: She has a normal mood and affect. Cognition and memory are normal.   Nursing note and vitals reviewed.      Assessment/Plan   Problem List Items Addressed This Visit     Controlled type 2 diabetes mellitus without complication, without long-term current use of insulin      Other Visit Diagnoses     Acute maxillary sinusitis, recurrence not specified    -  Primary    Relevant Orders    POC Influenza A / B (Completed)    Viral syndrome              New Medications Ordered This Visit   Medications   • levothyroxine (SYNTHROID, LEVOTHROID) 125 MCG tablet     Sig: Take 1 tablet by mouth Daily.     Dispense:  90 tablet     Refill:  1   • escitalopram (LEXAPRO) 10 MG tablet     Sig: Take 1 tablet by mouth Daily.     Dispense:  30 tablet     Refill:  2   • ondansetron ODT (ZOFRAN-ODT) 4 MG disintegrating tablet     Sig: Take 1 tablet by mouth Every 6 (Six) Hours As Needed for Nausea or Vomiting.     Dispense:  15 tablet     Refill:  0     rtc if sx worsen remain off work this week follow up soon

## 2018-01-16 ENCOUNTER — HOSPITAL ENCOUNTER (OUTPATIENT)
Dept: DIABETES SERVICES | Facility: HOSPITAL | Age: 63
Setting detail: RECURRING SERIES
Discharge: HOME | End: 2018-01-16

## 2018-01-17 ENCOUNTER — HOSPITAL ENCOUNTER (OUTPATIENT)
Dept: DIABETES SERVICES | Facility: HOSPITAL | Age: 63
Setting detail: RECURRING SERIES
Discharge: HOME | End: 2018-01-17

## 2018-01-17 PROCEDURE — G0109 DIAB MANAGE TRN IND/GROUP: HCPCS | Performed by: DIETITIAN, REGISTERED

## 2018-01-17 RX ORDER — ATORVASTATIN CALCIUM 10 MG/1
10 TABLET, FILM COATED ORAL DAILY
Qty: 30 TABLET | Refills: 5 | Status: SHIPPED | OUTPATIENT
Start: 2018-01-17 | End: 2018-06-25 | Stop reason: SDUPTHER

## 2018-01-18 ENCOUNTER — OFFICE VISIT (OUTPATIENT)
Dept: FAMILY MEDICINE CLINIC | Facility: CLINIC | Age: 63
End: 2018-01-18

## 2018-01-18 VITALS
SYSTOLIC BLOOD PRESSURE: 110 MMHG | HEART RATE: 92 BPM | HEIGHT: 62 IN | DIASTOLIC BLOOD PRESSURE: 62 MMHG | RESPIRATION RATE: 16 BRPM | BODY MASS INDEX: 32.2 KG/M2 | OXYGEN SATURATION: 99 % | WEIGHT: 175 LBS

## 2018-01-18 DIAGNOSIS — G44.1 OTHER VASCULAR HEADACHE: ICD-10-CM

## 2018-01-18 DIAGNOSIS — E11.9 CONTROLLED TYPE 2 DIABETES MELLITUS WITHOUT COMPLICATION, WITHOUT LONG-TERM CURRENT USE OF INSULIN (HCC): Primary | ICD-10-CM

## 2018-01-18 DIAGNOSIS — R42 DIZZINESS OF UNKNOWN CAUSE: ICD-10-CM

## 2018-01-18 LAB — HBA1C MFR BLD: 9.7 %

## 2018-01-18 PROCEDURE — 99214 OFFICE O/P EST MOD 30 MIN: CPT | Performed by: FAMILY MEDICINE

## 2018-01-18 PROCEDURE — 83036 HEMOGLOBIN GLYCOSYLATED A1C: CPT | Performed by: FAMILY MEDICINE

## 2018-01-18 RX ORDER — ALPRAZOLAM 0.25 MG/1
0.25 TABLET ORAL AS NEEDED
Refills: 0 | COMMUNITY
Start: 2018-01-09 | End: 2019-11-29

## 2018-01-18 NOTE — PROGRESS NOTES
Subjective   Idania Mccray is a 62 y.o. female.     Diabetes   She presents for her follow-up (Voiding glucose in the 140-150 range attended the diabetic classes) diabetic visit. She has type 2 diabetes mellitus. Her disease course has been improving. Hypoglycemia symptoms include dizziness and headaches. Pertinent negatives for hypoglycemia include no confusion, mood changes, nervousness/anxiousness, speech difficulty or sweats. Associated symptoms include blurred vision. Pertinent negatives for diabetes include no chest pain, no fatigue, no polydipsia, no polyphagia, no polyuria and no weakness. There are no hypoglycemic complications. There are no diabetic complications. Current diabetic treatment includes oral agent (monotherapy). She is compliant with treatment most of the time. An ACE inhibitor/angiotensin II receptor blocker is not being taken. Eye exam is current.   Headache    This is a new (Headaches did not improve with discontinuation of glipizide) problem. The current episode started 1 to 4 weeks ago. The problem occurs daily. The problem has been unchanged. The pain is located in the parietal and frontal region. The pain does not radiate. The quality of the pain is described as squeezing. The pain is moderate. Associated symptoms include blurred vision, dizziness and a loss of balance. Pertinent negatives include no fever, nausea, numbness, sore throat, vomiting or weakness. The symptoms are aggravated by activity. She has tried acetaminophen for the symptoms. The treatment provided mild relief. There is no history of migraine headaches. (We diagnosed with diabetes)   Dizziness   This is a new (Feels a little dizzy and off balance sometimes when walking) problem. The current episode started 1 to 4 weeks ago. The problem occurs daily. The problem has been unchanged. Associated symptoms include headaches. Pertinent negatives include no arthralgias, chest pain, chills, congestion, diaphoresis, fatigue,  "fever, joint swelling, nausea, numbness, sore throat, vomiting or weakness. The treatment provided mild relief.      Visual changes and headaches have not improved with improved diabetic control.  We'll proceed with further evaluation may need neurology evaluation as well.  She did work 4 hours a day and I advised her to go ahead and get her eyeglass prescription filled.  The following portions of the patient's history were reviewed and updated as appropriate: allergies, current medications, past social history and problem list.    Review of Systems   Constitutional: Negative for appetite change, chills, diaphoresis, fatigue, fever and unexpected weight change.   HENT: Negative for congestion and sore throat.    Eyes: Positive for blurred vision. Negative for visual disturbance.   Respiratory: Negative for chest tightness and shortness of breath.    Cardiovascular: Negative for chest pain, palpitations and leg swelling.   Gastrointestinal: Negative for diarrhea, nausea and vomiting.   Endocrine: Negative for polydipsia, polyphagia and polyuria.   Genitourinary: Negative for dysuria and hematuria.   Musculoskeletal: Negative for arthralgias and joint swelling.   Skin: Negative for color change.   Neurological: Positive for dizziness, headaches and loss of balance. Negative for speech difficulty, weakness, light-headedness and numbness.   Psychiatric/Behavioral: Negative for confusion and sleep disturbance. The patient is not nervous/anxious.        Objective   /62  Pulse 92  Resp 16  Ht 157.5 cm (62\")  Wt 79.4 kg (175 lb)  SpO2 99%  BMI 32.01 kg/m2  Physical Exam   Constitutional: She is oriented to person, place, and time. She appears well-developed and well-nourished. She is cooperative.   HENT:   Head: Normocephalic.   Right Ear: External ear normal.   Left Ear: External ear normal.   Nose: Nose normal.   Mouth/Throat: Oropharynx is clear and moist.   Eyes: Conjunctivae are normal. Pupils are equal, " round, and reactive to light. No scleral icterus.   Neck: Neck supple. No JVD present. Carotid bruit is not present. No thyromegaly present.   Cardiovascular: Normal rate, regular rhythm and normal heart sounds.    Pulmonary/Chest: Effort normal and breath sounds normal.   Abdominal: There is no hepatosplenomegaly.   Musculoskeletal: Normal range of motion.   Neurological: She is alert and oriented to person, place, and time. She displays normal reflexes. No cranial nerve deficit. She exhibits normal muscle tone. Coordination normal.   No focal deficits no lateralizing signs   Skin: Skin is warm and dry. No rash noted.   Psychiatric: She has a normal mood and affect. Cognition and memory are normal.   Nursing note and vitals reviewed.      Assessment/Plan   Problem List Items Addressed This Visit     Controlled type 2 diabetes mellitus without complication, without long-term current use of insulin - Primary    Relevant Orders    POC Glycosylated Hemoglobin (Hb A1C) (Completed)      Other Visit Diagnoses     Other vascular headache        Relevant Orders    MRI brain wo contrast    Dizziness of unknown cause              New Medications Ordered This Visit   Medications   • ALPRAZolam (XANAX) 0.25 MG tablet     Sig: Take 0.25 mg by mouth As Needed.     Refill:  0     Discontinue metformin over the weekend and switch to Jardiance 10 mg daily samples are provided.  If headaches do not improve or his metformin on Monday she'll call and let me know.

## 2018-01-22 ENCOUNTER — TELEPHONE (OUTPATIENT)
Dept: FAMILY MEDICINE CLINIC | Facility: CLINIC | Age: 63
End: 2018-01-22

## 2018-01-22 NOTE — TELEPHONE ENCOUNTER
LVM informing pt.  MARILEE SANDERS    ----- Message from Brandon Duenas MD sent at 1/22/2018 12:39 PM EST -----  Regarding: RE: RX CHANGE UPDATE  Contact: [phone]  Ok will continue jardiance if insurance covers rx sent  ----- Message -----     From: Dave Mann MA     Sent: 1/22/2018   9:25 AM       To: Brandon Duenas MD  Subject: FW: RX CHANGE UPDATE                                 ----- Message -----     From: Ginger Villanueva     Sent: 1/22/2018   9:12 AM       To: Dave Mann MA  Subject: RX CHANGE UPDATE                                 PT CALLED TO LET YOU KNOW THAT THE Jardiance 10 mg daily DID HELP WITH THE SIDE EFFECTS. PLEASE CALL THE PT AND LET HER KNOW IF AN RX WILL BE WRITTEN OR WHAT THE NEXT STEP IS.    [facility] [address]

## 2018-02-14 ENCOUNTER — HOSPITAL ENCOUNTER (OUTPATIENT)
Dept: DIABETES SERVICES | Facility: HOSPITAL | Age: 63
Setting detail: RECURRING SERIES
Discharge: HOME | End: 2018-02-14

## 2018-02-28 ENCOUNTER — OFFICE VISIT (OUTPATIENT)
Dept: FAMILY MEDICINE CLINIC | Facility: CLINIC | Age: 63
End: 2018-02-28

## 2018-02-28 VITALS
WEIGHT: 166 LBS | BODY MASS INDEX: 30.55 KG/M2 | SYSTOLIC BLOOD PRESSURE: 110 MMHG | DIASTOLIC BLOOD PRESSURE: 72 MMHG | HEIGHT: 62 IN | RESPIRATION RATE: 16 BRPM | OXYGEN SATURATION: 98 % | HEART RATE: 71 BPM

## 2018-02-28 DIAGNOSIS — E11.9 CONTROLLED TYPE 2 DIABETES MELLITUS WITHOUT COMPLICATION, WITHOUT LONG-TERM CURRENT USE OF INSULIN (HCC): Primary | ICD-10-CM

## 2018-02-28 DIAGNOSIS — I10 ESSENTIAL HYPERTENSION: ICD-10-CM

## 2018-02-28 LAB — HBA1C MFR BLD: 7.1 %

## 2018-02-28 PROCEDURE — 99213 OFFICE O/P EST LOW 20 MIN: CPT | Performed by: FAMILY MEDICINE

## 2018-02-28 PROCEDURE — 83036 HEMOGLOBIN GLYCOSYLATED A1C: CPT | Performed by: FAMILY MEDICINE

## 2018-02-28 RX ORDER — CANAGLIFLOZIN 300 MG/1
300 TABLET, FILM COATED ORAL DAILY
COMMUNITY
Start: 2018-02-24 | End: 2018-12-05

## 2018-03-01 NOTE — PROGRESS NOTES
Subjective   Idania Mccray is a 62 y.o. female.     Diabetes   She presents for her follow-up (Home glucose readings significantly better and feels better saw endo who confirmed on right track) diabetic visit. She has type 2 diabetes mellitus. Her disease course has been improving. There are no hypoglycemic associated symptoms. Pertinent negatives for hypoglycemia include no dizziness or headaches. Associated symptoms include visual change. Pertinent negatives for diabetes include no chest pain, no fatigue, no foot paresthesias, no polydipsia, no polyphagia, no polyuria and no weakness. There are no hypoglycemic complications. There are no diabetic complications. Current diabetic treatment includes oral agent (monotherapy). She is compliant with treatment all of the time. She participates in exercise intermittently. An ACE inhibitor/angiotensin II receptor blocker is being taken. Eye exam is current.   Hypertension   This is a chronic problem. The problem is unchanged. The problem is controlled. Pertinent negatives include no chest pain, headaches, malaise/fatigue, palpitations, peripheral edema or shortness of breath. Past treatments include ACE inhibitors and diuretics. The current treatment provides significant improvement. There are no compliance problems.  There is no history of angina, kidney disease or CAD/MI.        The following portions of the patient's history were reviewed and updated as appropriate: allergies, current medications, past social history and problem list.    Review of Systems   Constitutional: Negative for appetite change, diaphoresis, fatigue, malaise/fatigue and unexpected weight change.   HENT: Negative for congestion and sinus pressure.    Eyes: Negative for visual disturbance.   Respiratory: Negative for cough, chest tightness and shortness of breath.    Cardiovascular: Negative for chest pain, palpitations and leg swelling.   Gastrointestinal: Negative for diarrhea, nausea and  "vomiting.   Endocrine: Negative for polydipsia, polyphagia and polyuria.   Genitourinary: Negative for dysuria and hematuria.   Skin: Negative for color change and rash.   Neurological: Negative for dizziness, syncope, weakness, light-headedness, numbness and headaches.       Objective   /72  Pulse 71  Resp 16  Ht 157.5 cm (62\")  Wt 75.3 kg (166 lb)  SpO2 98%  BMI 30.36 kg/m2  Physical Exam   Constitutional: She is oriented to person, place, and time. She appears well-developed and well-nourished. She is cooperative.   HENT:   Head: Normocephalic.   Right Ear: External ear normal.   Left Ear: External ear normal.   Nose: Nose normal.   Mouth/Throat: Oropharynx is clear and moist.   Eyes: Conjunctivae are normal. Pupils are equal, round, and reactive to light. No scleral icterus.   Neck: Neck supple. Carotid bruit is not present. No thyromegaly present.   Cardiovascular: Normal rate, regular rhythm and normal heart sounds.    Pulmonary/Chest: Effort normal and breath sounds normal.   Abdominal: There is no hepatosplenomegaly.   Musculoskeletal: Normal range of motion. She exhibits no edema.   Neurological: She is alert and oriented to person, place, and time.   No focal deficits no lateralizing signs   Skin: Skin is warm and dry. No rash noted.   Psychiatric: She has a normal mood and affect. Cognition and memory are normal.   Nursing note and vitals reviewed.      Assessment/Plan   Problem List Items Addressed This Visit     Hypertension    Controlled type 2 diabetes mellitus without complication, without long-term current use of insulin - Primary    Relevant Medications    INVOKANA 300 MG tablet    Other Relevant Orders    POC Glycosylated Hemoglobin (Hb A1C) (Completed)          New Medications Ordered This Visit   Medications   • INVOKANA 300 MG tablet     Sig: Take 300 mg by mouth Daily.   • ONE TOUCH ULTRA TEST test strip     Si each by Other route 2 (Two) Times a Day. Use as instructed     " Dispense:  200 each     Refill:  3

## 2018-04-10 RX ORDER — FLUCONAZOLE 150 MG/1
150 TABLET ORAL ONCE
Qty: 1 TABLET | Refills: 0 | Status: SHIPPED | OUTPATIENT
Start: 2018-04-10 | End: 2018-04-10

## 2018-04-11 ENCOUNTER — TELEPHONE (OUTPATIENT)
Dept: FAMILY MEDICINE CLINIC | Facility: CLINIC | Age: 63
End: 2018-04-11

## 2018-04-11 NOTE — TELEPHONE ENCOUNTER
Called informed pt.  MARILEE Johnathan  A    ----- Message from Brandon Duenas MD sent at 4/10/2018  5:26 PM EDT -----  Regarding: RE: RX REQUEST FOR YEAST INFECTION  Contact: [phone]  rx sent  ----- Message -----  From: Dave Mann MA  Sent: 4/10/2018   4:57 PM  To: Brandon Duenas MD  Subject: FW: RX REQUEST FOR YEAST INFECTION                Pt says that she is taking Invokana.    ----- Message -----  From: Ginger Villanueva Rep  Sent: 4/10/2018  10:52 AM  To: Dave Mann MA  Subject: RX REQUEST FOR YEAST INFECTION                   PT IS TAKING AN RX THAT HAS GIVEN HER A YEAST INFECTION AND WOULD LIKE A RX CALLED In.    [facility] [address]

## 2018-06-25 ENCOUNTER — OFFICE VISIT (OUTPATIENT)
Dept: FAMILY MEDICINE CLINIC | Facility: CLINIC | Age: 63
End: 2018-06-25

## 2018-06-25 VITALS
WEIGHT: 162 LBS | BODY MASS INDEX: 29.81 KG/M2 | SYSTOLIC BLOOD PRESSURE: 110 MMHG | HEART RATE: 77 BPM | DIASTOLIC BLOOD PRESSURE: 72 MMHG | OXYGEN SATURATION: 98 % | HEIGHT: 62 IN | RESPIRATION RATE: 16 BRPM

## 2018-06-25 DIAGNOSIS — E11.9 CONTROLLED TYPE 2 DIABETES MELLITUS WITHOUT COMPLICATION, WITHOUT LONG-TERM CURRENT USE OF INSULIN (HCC): ICD-10-CM

## 2018-06-25 DIAGNOSIS — I10 ESSENTIAL HYPERTENSION: Primary | ICD-10-CM

## 2018-06-25 DIAGNOSIS — E78.01 FAMILIAL HYPERCHOLESTEROLEMIA: ICD-10-CM

## 2018-06-25 PROCEDURE — 99214 OFFICE O/P EST MOD 30 MIN: CPT | Performed by: FAMILY MEDICINE

## 2018-06-25 RX ORDER — LISINOPRIL 20 MG/1
20 TABLET ORAL DAILY
Qty: 90 TABLET | Refills: 1 | Status: SHIPPED | OUTPATIENT
Start: 2018-06-25 | End: 2019-08-26

## 2018-06-25 RX ORDER — ATORVASTATIN CALCIUM 10 MG/1
10 TABLET, FILM COATED ORAL DAILY
Qty: 90 TABLET | Refills: 1 | Status: SHIPPED | OUTPATIENT
Start: 2018-06-25 | End: 2019-01-17 | Stop reason: SDUPTHER

## 2018-06-25 RX ORDER — LEVOTHYROXINE SODIUM 0.12 MG/1
125 TABLET ORAL DAILY
Qty: 90 TABLET | Refills: 3 | Status: SHIPPED | OUTPATIENT
Start: 2018-06-25 | End: 2018-12-05 | Stop reason: DRUGHIGH

## 2018-06-25 RX ORDER — ESCITALOPRAM OXALATE 10 MG/1
10 TABLET ORAL DAILY
Qty: 90 TABLET | Refills: 1 | Status: SHIPPED | OUTPATIENT
Start: 2018-06-25 | End: 2019-01-17 | Stop reason: SDUPTHER

## 2018-06-26 NOTE — PROGRESS NOTES
Subjective   Idania Mccray is a 62 y.o. female.     Diabetes   She presents for her follow-up (Home glucose readings significantly better and feels better saw endo who confirmed on right track) diabetic visit. She has type 2 diabetes mellitus. Her disease course has been improving. There are no hypoglycemic associated symptoms. Pertinent negatives for hypoglycemia include no dizziness or headaches. Associated symptoms include visual change. Pertinent negatives for diabetes include no chest pain, no fatigue, no foot paresthesias, no polydipsia, no polyphagia, no polyuria and no weakness. There are no hypoglycemic complications. There are no diabetic complications. Current diabetic treatment includes oral agent (monotherapy). She is compliant with treatment all of the time. She participates in exercise intermittently. An ACE inhibitor/angiotensin II receptor blocker is being taken. Eye exam is current.   Hypertension   This is a chronic problem. The current episode started more than 1 month ago. The problem is unchanged. The problem is controlled. Pertinent negatives include no chest pain, headaches, malaise/fatigue, palpitations, peripheral edema or shortness of breath. Risk factors for coronary artery disease include post-menopausal state, sedentary lifestyle and stress. Past treatments include diuretics. Current antihypertension treatment includes ACE inhibitors and diuretics. The current treatment provides significant improvement. There are no compliance problems.  There is no history of angina, kidney disease or CAD/MI.   Hyperlipidemia   This is a chronic problem. The problem is controlled. Exacerbating diseases include diabetes and hypothyroidism. Pertinent negatives include no chest pain or shortness of breath. Current antihyperlipidemic treatment includes statins and diet change. The current treatment provides significant improvement of lipids. There are no compliance problems.         The following  "portions of the patient's history were reviewed and updated as appropriate: allergies, current medications, past social history and problem list.    Review of Systems   Constitutional: Negative for appetite change, diaphoresis, fatigue, malaise/fatigue and unexpected weight change.   HENT: Negative for congestion and sinus pressure.    Eyes: Negative for visual disturbance.   Respiratory: Negative for cough, chest tightness and shortness of breath.    Cardiovascular: Negative for chest pain, palpitations and leg swelling.   Gastrointestinal: Negative for diarrhea, nausea and vomiting.   Endocrine: Negative for polydipsia, polyphagia and polyuria.   Genitourinary: Negative for dysuria and hematuria.   Skin: Negative for color change and rash.   Neurological: Negative for dizziness, syncope, weakness, light-headedness, numbness and headaches.       Objective   /72   Pulse 77   Resp 16   Ht 157.5 cm (62\")   Wt 73.5 kg (162 lb)   SpO2 98%   BMI 29.63 kg/m²   Physical Exam   Constitutional: She is oriented to person, place, and time. She appears well-developed and well-nourished. She is cooperative.   HENT:   Head: Normocephalic.   Right Ear: External ear normal.   Left Ear: External ear normal.   Nose: Nose normal.   Mouth/Throat: Oropharynx is clear and moist.   Eyes: Conjunctivae are normal. Pupils are equal, round, and reactive to light. No scleral icterus.   Neck: Neck supple. No JVD present. Carotid bruit is not present. No thyromegaly present.   Cardiovascular: Normal rate, regular rhythm and normal heart sounds.    Pulmonary/Chest: Effort normal and breath sounds normal.   Abdominal: There is no hepatosplenomegaly.   Musculoskeletal: Normal range of motion. She exhibits no edema.   Neurological: She is alert and oriented to person, place, and time.   No focal deficits no lateralizing signs   Skin: Skin is warm and dry. No rash noted.   Psychiatric: She has a normal mood and affect. Cognition and " memory are normal.   Nursing note and vitals reviewed.      Assessment/Plan   Problem List Items Addressed This Visit     Hyperlipidemia    Relevant Medications    atorvastatin (LIPITOR) 10 MG tablet    Hypertension - Primary    Relevant Medications    lisinopril (PRINIVIL,ZESTRIL) 20 MG tablet    Controlled type 2 diabetes mellitus without complication, without long-term current use of insulin          New Medications Ordered This Visit   Medications   • atorvastatin (LIPITOR) 10 MG tablet     Sig: Take 1 tablet by mouth Daily.     Dispense:  90 tablet     Refill:  1   • levothyroxine (SYNTHROID, LEVOTHROID) 125 MCG tablet     Sig: Take 1 tablet by mouth Daily.     Dispense:  90 tablet     Refill:  3   • escitalopram (LEXAPRO) 10 MG tablet     Sig: Take 1 tablet by mouth Daily.     Dispense:  90 tablet     Refill:  1   • lisinopril (PRINIVIL,ZESTRIL) 20 MG tablet     Sig: Take 1 tablet by mouth Daily.     Dispense:  90 tablet     Refill:  1

## 2018-12-05 ENCOUNTER — OFFICE VISIT (OUTPATIENT)
Dept: FAMILY MEDICINE CLINIC | Facility: CLINIC | Age: 63
End: 2018-12-05

## 2018-12-05 VITALS
WEIGHT: 156.8 LBS | BODY MASS INDEX: 28.85 KG/M2 | RESPIRATION RATE: 16 BRPM | OXYGEN SATURATION: 98 % | HEART RATE: 100 BPM | DIASTOLIC BLOOD PRESSURE: 70 MMHG | HEIGHT: 62 IN | SYSTOLIC BLOOD PRESSURE: 110 MMHG

## 2018-12-05 DIAGNOSIS — E11.9 CONTROLLED TYPE 2 DIABETES MELLITUS WITHOUT COMPLICATION, WITHOUT LONG-TERM CURRENT USE OF INSULIN (HCC): ICD-10-CM

## 2018-12-05 DIAGNOSIS — E08.65 DIABETES MELLITUS DUE TO UNDERLYING CONDITION, UNCONTROLLED, WITH HYPERGLYCEMIA (HCC): Primary | ICD-10-CM

## 2018-12-05 DIAGNOSIS — R30.0 DYSURIA: ICD-10-CM

## 2018-12-05 LAB
BILIRUB BLD-MCNC: NEGATIVE MG/DL
CLARITY, POC: CLEAR
COLOR UR: YELLOW
GLUCOSE BLDC GLUCOMTR-MCNC: NORMAL MG/DL (ref 70–130)
GLUCOSE UR STRIP-MCNC: ABNORMAL MG/DL
HBA1C MFR BLD: 12.2 %
KETONES UR QL: NEGATIVE
LEUKOCYTE EST, POC: NEGATIVE
NITRITE UR-MCNC: NEGATIVE MG/ML
PH UR: 5.5 [PH] (ref 5–8)
PROT UR STRIP-MCNC: NEGATIVE MG/DL
RBC # UR STRIP: ABNORMAL /UL
SP GR UR: 1.01 (ref 1–1.03)
UROBILINOGEN UR QL: NORMAL

## 2018-12-05 PROCEDURE — 99214 OFFICE O/P EST MOD 30 MIN: CPT | Performed by: FAMILY MEDICINE

## 2018-12-05 PROCEDURE — 82962 GLUCOSE BLOOD TEST: CPT | Performed by: FAMILY MEDICINE

## 2018-12-05 PROCEDURE — 81003 URINALYSIS AUTO W/O SCOPE: CPT | Performed by: FAMILY MEDICINE

## 2018-12-05 PROCEDURE — 83036 HEMOGLOBIN GLYCOSYLATED A1C: CPT | Performed by: FAMILY MEDICINE

## 2018-12-05 RX ORDER — INSULIN GLARGINE 100 [IU]/ML
25 INJECTION, SOLUTION SUBCUTANEOUS NIGHTLY
Qty: 3 ML | Refills: 0 | COMMUNITY
Start: 2018-12-05 | End: 2019-08-22

## 2018-12-05 RX ORDER — SITAGLIPTIN 100 MG/1
100 TABLET, FILM COATED ORAL DAILY
Refills: 5 | COMMUNITY
Start: 2018-11-19 | End: 2019-08-22

## 2018-12-05 RX ORDER — LEVOTHYROXINE SODIUM 0.07 MG/1
75 TABLET ORAL DAILY
Refills: 3 | COMMUNITY
Start: 2018-09-20 | End: 2020-10-19 | Stop reason: SDUPTHER

## 2018-12-05 RX ORDER — METFORMIN HYDROCHLORIDE 500 MG/1
1000 TABLET, EXTENDED RELEASE ORAL 2 TIMES DAILY
Refills: 5 | COMMUNITY
Start: 2018-10-17 | End: 2020-11-13 | Stop reason: SDUPTHER

## 2018-12-06 NOTE — PROGRESS NOTES
Subjective   Idania Mccray is a 63 y.o. female.     Diabetes   She presents for her follow-up (she was unaware that glucose had gone up) diabetic visit. She has type 2 diabetes mellitus. Her disease course has been worsening. There are no hypoglycemic associated symptoms. Pertinent negatives for hypoglycemia include no dizziness or headaches. Associated symptoms include polydipsia and polyuria. Pertinent negatives for diabetes include no chest pain, no fatigue, no polyphagia and no weakness. There are no hypoglycemic complications. Symptoms are stable. There are no diabetic complications. Current diabetic treatment includes oral agent (dual therapy) (followed by madie). She is compliant with treatment most of the time.   Hypertension   This is a chronic problem. The problem is unchanged. The problem is controlled. Pertinent negatives include no chest pain, headaches, palpitations, peripheral edema or shortness of breath. Past treatments include lifestyle changes. Current antihypertension treatment includes ACE inhibitors and lifestyle changes. The current treatment provides significant improvement. There are no compliance problems.  There is no history of angina, kidney disease or CAD/MI.        The following portions of the patient's history were reviewed and updated as appropriate: allergies, current medications, past social history and problem list.    Review of Systems   Constitutional: Negative for appetite change, diaphoresis, fatigue and unexpected weight change.   HENT: Negative for congestion and sore throat.    Eyes: Negative for visual disturbance.   Respiratory: Negative for cough, chest tightness and shortness of breath.    Cardiovascular: Negative for chest pain, palpitations and leg swelling.   Gastrointestinal: Negative for diarrhea, nausea and vomiting.   Endocrine: Positive for polydipsia and polyuria. Negative for polyphagia.   Genitourinary: Positive for frequency.   Skin: Negative for color  "change and rash.   Neurological: Negative for dizziness, syncope, weakness, light-headedness, numbness and headaches.       Objective   /70   Pulse 100   Resp 16   Ht 157.5 cm (62\")   Wt 71.1 kg (156 lb 12.8 oz)   SpO2 98%   BMI 28.68 kg/m²   Physical Exam   Constitutional: She is oriented to person, place, and time. She appears well-developed and well-nourished. She is cooperative.   HENT:   Head: Normocephalic.   Right Ear: External ear normal.   Left Ear: External ear normal.   Nose: Nose normal.   Mouth/Throat: Oropharynx is clear and moist.   Eyes: Conjunctivae are normal. Pupils are equal, round, and reactive to light. No scleral icterus.   Neck: Neck supple. No JVD present. Carotid bruit is not present. No thyromegaly present.   Cardiovascular: Normal rate, regular rhythm and normal heart sounds.   Pulmonary/Chest: Effort normal and breath sounds normal.   Abdominal: There is no hepatosplenomegaly.   Musculoskeletal: Normal range of motion.   Neurological: She is alert and oriented to person, place, and time.   No focal deficits no lateralizing signs   Skin: Skin is warm and dry. No rash noted.   Psychiatric: She has a normal mood and affect. Cognition and memory are normal.   Nursing note and vitals reviewed.      Assessment/Plan   Problem List Items Addressed This Visit        Active Problems    Controlled type 2 diabetes mellitus without complication, without long-term current use of insulin (CMS/[facility])    Relevant Medications    metFORMIN ER (GLUCOPHAGE-XR) 500 MG 24 hr tablet    JANUVIA 100 MG tablet    Other Relevant Orders    POC Glycosylated Hemoglobin (Hb A1C) (Completed)    POC Glucose (Completed)      Other Visit Diagnoses     Diabetes mellitus due to underlying condition, uncontrolled, with hyperglycemia (CMS/[facility])    -  Primary    Relevant Medications    metFORMIN ER (GLUCOPHAGE-XR) 500 MG 24 hr tablet    JANUVIA 100 MG tablet    Other Relevant Orders    Comprehensive Metabolic Panel    " Lipid Panel    CBC & Differential    Amylase    Lipase    C-Peptide    Dysuria        Relevant Orders    POC Urinalysis Dipstick, Automated (Completed)          No orders of the defined types were placed in this encounter.    Significantly elevated A1C and glucose above our machine able to read will start insulin and she will call endo in am rtc Friday for lab or go to ER if feels worse as described. Patient instructed on how to administer insulin with sample pen.

## 2018-12-07 ENCOUNTER — LAB (OUTPATIENT)
Dept: FAMILY MEDICINE CLINIC | Facility: CLINIC | Age: 63
End: 2018-12-07

## 2018-12-07 DIAGNOSIS — E08.65 DIABETES MELLITUS DUE TO UNDERLYING CONDITION, UNCONTROLLED, WITH HYPERGLYCEMIA (HCC): ICD-10-CM

## 2018-12-07 LAB
ALBUMIN SERPL-MCNC: 4.36 G/DL (ref 3.2–4.8)
ALBUMIN/GLOB SERPL: 2 G/DL (ref 1.5–2.5)
ALP SERPL-CCNC: 81 U/L (ref 25–100)
ALT SERPL W P-5'-P-CCNC: 22 U/L (ref 7–40)
AMYLASE SERPL-CCNC: 32 U/L (ref 30–118)
ANION GAP SERPL CALCULATED.3IONS-SCNC: 7 MMOL/L (ref 3–11)
ARTICHOKE IGE QN: 163 MG/DL (ref 0–130)
AST SERPL-CCNC: 18 U/L (ref 0–33)
BASOPHILS # BLD AUTO: 0.02 10*3/MM3 (ref 0–0.2)
BASOPHILS NFR BLD AUTO: 0.6 % (ref 0–1)
BILIRUB SERPL-MCNC: 0.6 MG/DL (ref 0.3–1.2)
BUN BLD-MCNC: 13 MG/DL (ref 9–23)
BUN/CREAT SERPL: 14.6 (ref 7–25)
CALCIUM SPEC-SCNC: 9.5 MG/DL (ref 8.7–10.4)
CHLORIDE SERPL-SCNC: 99 MMOL/L (ref 99–109)
CHOLEST SERPL-MCNC: 224 MG/DL (ref 0–200)
CO2 SERPL-SCNC: 27 MMOL/L (ref 20–31)
CREAT BLD-MCNC: 0.89 MG/DL (ref 0.6–1.3)
DEPRECATED RDW RBC AUTO: 46.3 FL (ref 37–54)
EOSINOPHIL # BLD AUTO: 0.06 10*3/MM3 (ref 0–0.3)
EOSINOPHIL NFR BLD AUTO: 1.7 % (ref 0–3)
ERYTHROCYTE [DISTWIDTH] IN BLOOD BY AUTOMATED COUNT: 14.1 % (ref 11.3–14.5)
GFR SERPL CREATININE-BSD FRML MDRD: 64 ML/MIN/1.73
GLOBULIN UR ELPH-MCNC: 2.1 GM/DL
GLUCOSE BLD-MCNC: 264 MG/DL (ref 70–100)
HCT VFR BLD AUTO: 40.5 % (ref 34.5–44)
HDLC SERPL-MCNC: 60 MG/DL (ref 40–60)
HGB BLD-MCNC: 13.2 G/DL (ref 11.5–15.5)
IMM GRANULOCYTES # BLD: 0 10*3/MM3 (ref 0–0.03)
IMM GRANULOCYTES NFR BLD: 0 % (ref 0–0.6)
LIPASE SERPL-CCNC: 45 U/L (ref 6–51)
LYMPHOCYTES # BLD AUTO: 1.25 10*3/MM3 (ref 0.6–4.8)
LYMPHOCYTES NFR BLD AUTO: 34.5 % (ref 24–44)
MCH RBC QN AUTO: 28.9 PG (ref 27–31)
MCHC RBC AUTO-ENTMCNC: 32.6 G/DL (ref 32–36)
MCV RBC AUTO: 88.6 FL (ref 80–99)
MONOCYTES # BLD AUTO: 0.27 10*3/MM3 (ref 0–1)
MONOCYTES NFR BLD AUTO: 7.5 % (ref 0–12)
NEUTROPHILS # BLD AUTO: 2.02 10*3/MM3 (ref 1.5–8.3)
NEUTROPHILS NFR BLD AUTO: 55.7 % (ref 41–71)
PLATELET # BLD AUTO: 132 10*3/MM3 (ref 150–450)
PMV BLD AUTO: 12.9 FL (ref 6–12)
POTASSIUM BLD-SCNC: 4.5 MMOL/L (ref 3.5–5.5)
PROT SERPL-MCNC: 6.5 G/DL (ref 5.7–8.2)
RBC # BLD AUTO: 4.57 10*6/MM3 (ref 3.89–5.14)
SODIUM BLD-SCNC: 133 MMOL/L (ref 132–146)
TRIGL SERPL-MCNC: 102 MG/DL (ref 0–150)
WBC NRBC COR # BLD: 3.62 10*3/MM3 (ref 3.5–10.8)

## 2018-12-07 PROCEDURE — 80061 LIPID PANEL: CPT | Performed by: FAMILY MEDICINE

## 2018-12-07 PROCEDURE — 80053 COMPREHEN METABOLIC PANEL: CPT | Performed by: FAMILY MEDICINE

## 2018-12-07 PROCEDURE — 83690 ASSAY OF LIPASE: CPT | Performed by: FAMILY MEDICINE

## 2018-12-07 PROCEDURE — 82150 ASSAY OF AMYLASE: CPT | Performed by: FAMILY MEDICINE

## 2018-12-07 PROCEDURE — 85025 COMPLETE CBC W/AUTO DIFF WBC: CPT | Performed by: FAMILY MEDICINE

## 2018-12-07 PROCEDURE — 36415 COLL VENOUS BLD VENIPUNCTURE: CPT | Performed by: FAMILY MEDICINE

## 2018-12-12 ENCOUNTER — TELEPHONE (OUTPATIENT)
Dept: FAMILY MEDICINE CLINIC | Facility: CLINIC | Age: 63
End: 2018-12-12

## 2018-12-12 NOTE — TELEPHONE ENCOUNTER
Called informed pt, she verbalized understanding.  She informed me that she has an appt with her endocrinologist, Dr. Stover 1/2/19.    MARILEE SANDERS    ----- Message from Brandon Duenas MD sent at 12/12/2018  3:00 PM EST -----  Regarding: RE: LAB RESULTS  Contact: [phone]  See lab note glucose down to 264  ----- Message -----  From: Dave Mann MA  Sent: 12/12/2018   1:47 PM  To: Brandon Duenas MD  Subject: FW: LAB RESULTS                                      ----- Message -----  From: Beatrice Branham  Sent: 12/12/2018   1:17 PM  To: Dave Mann MA  Subject: LAB RESULTS                                      Patient called and said they would like their lab results.  Please call and advise @ [phone].  Thank you.

## 2018-12-13 ENCOUNTER — TELEPHONE (OUTPATIENT)
Dept: FAMILY MEDICINE CLINIC | Facility: CLINIC | Age: 63
End: 2018-12-13

## 2019-01-02 ENCOUNTER — OFFICE VISIT (OUTPATIENT)
Dept: FAMILY MEDICINE CLINIC | Facility: CLINIC | Age: 64
End: 2019-01-02

## 2019-01-02 VITALS
BODY MASS INDEX: 27.97 KG/M2 | RESPIRATION RATE: 18 BRPM | DIASTOLIC BLOOD PRESSURE: 66 MMHG | SYSTOLIC BLOOD PRESSURE: 100 MMHG | HEART RATE: 85 BPM | HEIGHT: 62 IN | OXYGEN SATURATION: 98 % | TEMPERATURE: 98.1 F | WEIGHT: 152 LBS

## 2019-01-02 DIAGNOSIS — R19.7 DIARRHEA, UNSPECIFIED TYPE: ICD-10-CM

## 2019-01-02 DIAGNOSIS — R11.2 NON-INTRACTABLE VOMITING WITH NAUSEA, UNSPECIFIED VOMITING TYPE: ICD-10-CM

## 2019-01-02 DIAGNOSIS — R68.89 FLU-LIKE SYMPTOMS: ICD-10-CM

## 2019-01-02 DIAGNOSIS — J01.00 ACUTE NON-RECURRENT MAXILLARY SINUSITIS: Primary | ICD-10-CM

## 2019-01-02 PROCEDURE — 87804 INFLUENZA ASSAY W/OPTIC: CPT | Performed by: NURSE PRACTITIONER

## 2019-01-02 PROCEDURE — 99214 OFFICE O/P EST MOD 30 MIN: CPT | Performed by: NURSE PRACTITIONER

## 2019-01-02 RX ORDER — LEVOTHYROXINE SODIUM 0.12 MG/1
125 TABLET ORAL DAILY
Refills: 3 | COMMUNITY
Start: 2018-12-08 | End: 2019-06-28 | Stop reason: DRUGHIGH

## 2019-01-02 RX ORDER — ONDANSETRON 4 MG/1
4 TABLET, ORALLY DISINTEGRATING ORAL EVERY 8 HOURS PRN
Qty: 20 TABLET | Refills: 0 | Status: SHIPPED | OUTPATIENT
Start: 2019-01-02 | End: 2021-07-06

## 2019-01-02 RX ORDER — AZITHROMYCIN 250 MG/1
TABLET, FILM COATED ORAL
Qty: 6 TABLET | Refills: 0 | Status: SHIPPED | OUTPATIENT
Start: 2019-01-02 | End: 2019-01-07

## 2019-01-02 NOTE — PROGRESS NOTES
Subjective   Idania Mccray is a 63 y.o. female.     URI    This is a new problem. The current episode started in the past 7 days. The problem has been gradually worsening. There has been no fever. Associated symptoms include congestion, coughing, diarrhea, nausea, a plugged ear sensation, a sore throat and vomiting (resolved). Pertinent negatives include no abdominal pain, dysuria, rash or wheezing. She has tried nothing for the symptoms.   GI Problem   The primary symptoms include fatigue, nausea, vomiting (resolved), diarrhea and myalgias. Primary symptoms do not include fever, abdominal pain, melena, hematemesis, jaundice, hematochezia, dysuria, arthralgias or rash. The illness began yesterday. Progression since onset: vomiting resolved.   The illness does not include chills.       The following portions of the patient's history were reviewed and updated as appropriate: allergies, current medications, past family history, past medical history, past social history, past surgical history and problem list.     Allergies   Allergen Reactions   • Augmentin [Amoxicillin-Pot Clavulanate] Diarrhea     Review of Systems   Constitutional: Positive for appetite change and fatigue. Negative for chills, diaphoresis and fever.   HENT: Positive for congestion, postnasal drip, sinus pressure and sore throat. Negative for trouble swallowing and voice change.    Respiratory: Positive for cough. Negative for chest tightness, shortness of breath, wheezing and stridor.    Cardiovascular: Negative.    Gastrointestinal: Positive for diarrhea, nausea and vomiting (resolved). Negative for abdominal distention, abdominal pain, blood in stool, hematemesis, hematochezia, jaundice and melena.   Genitourinary: Negative for dysuria.   Musculoskeletal: Positive for myalgias. Negative for arthralgias.   Skin: Negative for rash.       Objective   Physical Exam   Constitutional: She is oriented to person, place, and time. She appears  well-developed and well-nourished. She is cooperative. No distress.   HENT:   Head: Normocephalic and atraumatic.   Right Ear: External ear normal. Tympanic membrane is injected and bulging. A middle ear effusion is present.   Left Ear: External ear normal. Tympanic membrane is injected and bulging. A middle ear effusion is present.   Nose: Mucosal edema present. Right sinus exhibits maxillary sinus tenderness. Left sinus exhibits maxillary sinus tenderness.   Mouth/Throat: Uvula is midline and mucous membranes are normal. Posterior oropharyngeal erythema present.   Neck: Normal range of motion. Neck supple. No thyromegaly present.   Cardiovascular: Normal rate, regular rhythm and normal heart sounds.   Pulmonary/Chest: Effort normal and breath sounds normal.   Abdominal: Soft. Normal appearance. She exhibits no distension. There is no hepatosplenomegaly. There is no tenderness. There is no rigidity, no rebound, no guarding and no CVA tenderness.   Lymphadenopathy:     She has no cervical adenopathy.   Neurological: She is alert and oriented to person, place, and time.   Skin: Skin is warm, dry and intact. No rash noted. She is not diaphoretic.   Psychiatric: She has a normal mood and affect. Her behavior is normal. Judgment and thought content normal.   Vitals reviewed.    Vitals:    01/02/19 1815   BP: 100/66   Pulse: 85   Resp: 18   Temp: 98.1 °F (36.7 °C)   SpO2: 98%   Body mass index is 27.8 kg/m².     Assessment/Plan   Idania was seen today for flu symptoms.    Diagnoses and all orders for this visit:    Acute non-recurrent maxillary sinusitis  -     azithromycin (ZITHROMAX) 250 MG tablet; Take 2 tablets the first day, then 1 tablet daily for 4 days.    Flu-like symptoms  -     POCT Influenza A/B    Lab Results   Component Value Date    RAPFLUA negative 01/02/2019    RAPFLUB negative 01/02/2019     Non-intractable vomiting with nausea, unspecified vomiting type  -     ondansetron ODT (ZOFRAN-ODT) 4 MG  disintegrating tablet; Take 1 tablet by mouth Every 8 (Eight) Hours As Needed for Nausea or Vomiting.    Diarrhea, unspecified type        -     Recommend OTC Pepto-Bismol as directed.     Discussed the nature of the medical condition(s) risks, complications, implications, management, safe and proper use of medications. Encouraged medication compliance, and keeping scheduled follow up appointments with me and any other providers.

## 2019-01-03 NOTE — PATIENT INSTRUCTIONS
Diarrhea, Adult  Diarrhea is when you have loose and water poop (stool) often. Diarrhea can make you feel weak and cause you to get dehydrated. Dehydration can make you tired and thirsty, make you have a dry mouth, and make it so you pee (urinate) less often. Diarrhea often lasts 2-3 days. However, it can last longer if it is a sign of something more serious. It is important to treat your diarrhea as told by your doctor.  Follow these instructions at home:  Eating and drinking    Follow these recommendations as told by your doctor:  · Take an oral rehydration solution (ORS). This is a drink that is sold at pharmacies and stores.  · Drink clear fluids, such as:  ? Water.  ? Ice chips.  ? Diluted fruit juice.  ? Low-calorie sports drinks.  · Eat bland, easy-to-digest foods in small amounts as you are able. These foods include:  ? Bananas.  ? Applesauce.  ? Rice.  ? Low-fat (lean) meats.  ? Toast.  ? Crackers.  · Avoid drinking fluids that have a lot of sugar or caffeine in them.  · Avoid alcohol.  · Avoid spicy or fatty foods.    General instructions    · Drink enough fluid to keep your pee (urine) clear or pale yellow.  · Wash your hands often. If you cannot use soap and water, use hand .  · Make sure that all people in your home wash their hands well and often.  · Take over-the-counter and prescription medicines only as told by your doctor.  · Rest at home while you get better.  · Watch your condition for any changes.  · Take a warm bath to help with any burning or pain from having diarrhea.  · Keep all follow-up visits as told by your doctor. This is important.  Contact a doctor if:  · You have a fever.  · Your diarrhea gets worse.  · You have new symptoms.  · You cannot keep fluids down.  · You feel light-headed or dizzy.  · You have a headache.  · You have muscle cramps.  Get help right away if:  · You have chest pain.  · You feel very weak or you pass out (faint).  · You have bloody or black poop or  poop that look like tar.  · You have very bad pain, cramping, or bloating in your belly (abdomen).  · You have trouble breathing or you are breathing very quickly.  · Your heart is beating very quickly.  · Your skin feels cold and clammy.  · You feel confused.  · You have signs of dehydration, such as:  ? Dark pee, hardly any pee, or no pee.  ? Cracked lips.  ? Dry mouth.  ? Sunken eyes.  ? Sleepiness.  ? Weakness.  This information is not intended to replace advice given to you by your health care provider. Make sure you discuss any questions you have with your health care provider.  Document Released: 06/05/2009 Document Revised: 07/07/2017 Document Reviewed: 08/23/2016  [employer] Interactive Patient Education © 2018 [employer]    Food Choices to Help Relieve Diarrhea, Adult  When you have diarrhea, the foods you eat and your eating habits are very important. Choosing the right foods and drinks can help:  · Relieve diarrhea.  · Replace lost fluids and nutrients.  · Prevent dehydration.    What general guidelines should I follow?  Relieving diarrhea  · Choose foods with less than 2 g or .07 oz. of fiber per serving.  · Limit fats to less than 8 tsp (38 g or 1.34 oz.) a day.  · Avoid the following:  ? Foods and beverages sweetened with high-fructose corn syrup, honey, or sugar alcohols such as xylitol, sorbitol, and mannitol.  ? Foods that contain a lot of fat or sugar.  ? Fried, greasy, or spicy foods.  ? High-fiber grains, breads, and cereals.  ? Raw fruits and vegetables.  · Eat foods that are rich in probiotics. These foods include dairy products such as yogurt and fermented milk products. They help increase healthy bacteria in the stomach and intestines (gastrointestinal tract, or GI tract).  · If you have lactose intolerance, avoid dairy products. These may make your diarrhea worse.  · Take medicine to help stop diarrhea (antidiarrheal medicine) only as told by your health care provider.  Replacing  nutrients  · Eat small meals or snacks every 3-4 hours.  · Eat bland foods, such as white rice, toast, or baked potato, until your diarrhea starts to get better. Gradually reintroduce nutrient-rich foods as tolerated or as told by your health care provider. This includes:  ? Well-cooked protein foods.  ? Peeled, seeded, and soft-cooked fruits and vegetables.  ? Low-fat dairy products.  · Take vitamin and mineral supplements as told by your health care provider.  Preventing dehydration    · Start by sipping water or a special solution to prevent dehydration (oral rehydration solution, ORS). Urine that is clear or pale yellow means that you are getting enough fluid.  · Try to drink at least 8-10 cups of fluid each day to help replace lost fluids.  · You may add other liquids in addition to water, such as clear juice or decaffeinated sports drinks, as tolerated or as told by your health care provider.  · Avoid drinks with caffeine, such as coffee, tea, or soft drinks.  · Avoid alcohol.  What foods are recommended?  The items listed may not be a complete list. Talk with your health care provider about what dietary choices are best for you.  Grains  White rice. White, Bulgarian, or jhoana breads (fresh or toasted), including plain rolls, buns, or bagels. White pasta. Saltine, soda, or joseph crackers. Pretzels. Low-fiber cereal. Cooked cereals made with water (such as cornmeal, farina, or cream cereals). Plain muffins. Matzo. [place] toast. Zwieback.  Vegetables  Potatoes (without the skin). Most well-cooked and canned vegetables without skins or seeds. Tender lettuce.  Fruits  Apple sauce. Fruits canned in juice. Cooked apricots, cherries, grapefruit, peaches, pears, or plums. Fresh bananas and cantaloupe.  Meats and other protein foods  Baked or boiled chicken. Eggs. Tofu. Fish. Seafood. Smooth nut butters. Ground or well-cooked tender beef, ham, veal, lamb, pork, or poultry.  Dairy  Plain yogurt, kefir, and unsweetened  liquid yogurt. Lactose-free milk, buttermilk, skim milk, or soy milk. Low-fat or nonfat hard cheese.  Beverages  Water. Low-calorie sports drinks. Fruit juices without pulp. Strained tomato and vegetable juices. Decaffeinated teas. Sugar-free beverages not sweetened with sugar alcohols. Oral rehydration solutions, if approved by your health care provider.  Seasoning and other foods  Bouillon, broth, or soups made from recommended foods.  What foods are not recommended?  The items listed may not be a complete list. Talk with your health care provider about what dietary choices are best for you.  Grains  Whole grain, whole wheat, bran, or rye breads, rolls, pastas, and crackers. Wild or brown rice. Whole grain or bran cereals. Barley. Oats and oatmeal. Corn tortillas or taco shells. Granola. Popcorn.  Vegetables  Raw vegetables. Fried vegetables. Cabbage, broccoli, [place] sprouts, artichokes, baked beans, beet greens, corn, kale, legumes, peas, sweet potatoes, and yams. Potato skins. Cooked spinach and cabbage.  Fruits  Dried fruit, including raisins and dates. Raw fruits. Stewed or dried prunes. Canned fruits with syrup.  Meat and other protein foods  Fried or fatty meats. Deli meats. [place]kinson nut butters. Nuts and seeds. Beans and lentils. Castaneda. Hot dogs. Sausage.  Dairy  High-fat cheeses. Whole milk, chocolate milk, and beverages made with milk, such as milk shakes. Half-and-half. Cream. sour cream. Ice cream.  Beverages  Caffeinated beverages (such as coffee, tea, soda, or energy drinks). Alcoholic beverages. Fruit juices with pulp. Prune juice. Soft drinks sweetened with high-fructose corn syrup or sugar alcohols. High-calorie sports drinks.  Fats and oils  Butter. Cream sauces. Margarine. Salad oils. Plain salad dressings. Olives. Avocados. Mayonnaise.  Sweets and desserts  Sweet rolls, doughnuts, and sweet breads. Sugar-free desserts sweetened with sugar alcohols such as xylitol and sorbitol.  Seasoning and  other foods  Honey. Hot sauce. Chili powder. Gravy. Cream-based or milk-based soups. Pancakes and waffles.  Summary  · When you have diarrhea, the foods you eat and your eating habits are very important.  · Make sure you get at least 8-10 cups of fluid each day, or enough to keep your urine clear or pale yellow.  · Eat bland foods and gradually reintroduce healthy, nutrient-rich foods as tolerated, or as told by your health care provider.  · Avoid high-fiber, fried, greasy, or spicy foods.  This information is not intended to replace advice given to you by your health care provider. Make sure you discuss any questions you have with your health care provider.  Document Released: 03/09/2005 Document Revised: 12/15/2017 Document Reviewed: 12/15/2017  [employer] Interactive Patient Education © 2018 [employer]    Nausea and Vomiting, Adult  Nausea is the feeling that you have an upset stomach or have to vomit. As nausea gets worse, it can lead to vomiting. Vomiting occurs when stomach contents are thrown up and out of the mouth. Vomiting can make you feel weak and cause you to become dehydrated. Dehydration can make you tired and thirsty, cause you to have a dry mouth, and decrease how often you urinate. Older adults and people with other diseases or a weak immune system are at higher risk for dehydration. It is important to treat your nausea and vomiting as told by your health care provider.  Follow these instructions at home:  Follow instructions from your health care provider about how to care for yourself at home.  Eating and drinking  Follow these recommendations as told by your health care provider:  · Take an oral rehydration solution (ORS). This is a drink that is sold at pharmacies and retail stores.  · Drink clear fluids in small amounts as you are able. Clear fluids include water, ice chips, diluted fruit juice, and low-calorie sports drinks.  · Eat bland, easy-to-digest foods in small amounts as you are  able. These foods include bananas, applesauce, rice, lean meats, toast, and crackers.  · Avoid fluids that contain a lot of sugar or caffeine, such as energy drinks, sports drinks, and soda.  · Avoid alcohol.  · Avoid spicy or fatty foods.    General instructions  · Drink enough fluid to keep your urine clear or pale yellow.  · Wash your hands often. If soap and water are not available, use hand .  · Make sure that all people in your household wash their hands well and often.  · Take over-the-counter and prescription medicines only as told by your health care provider.  · Rest at home while you recover.  · Watch your condition for any changes.  · Breathe slowly and deeply when you feel nauseated.  · Keep all follow-up visits as told by your health care provider. This is important.  Contact a health care provider if:  · You have a fever.  · You cannot keep fluids down.  · Your symptoms get worse.  · You have new symptoms.  · Your nausea does not go away after two days.  · You feel light-headed or dizzy.  · You have a headache.  · You have muscle cramps.  Get help right away if:  · You have pain in your chest, neck, arm, or jaw.  · You feel extremely weak or you faint.  · You have persistent vomiting.  · You see blood in your vomit.  · Your vomit looks like black coffee grounds.  · You have bloody or black stools or stools that look like tar.  · You have a severe headache, a stiff neck, or both.  · You have a rash.  · You have severe pain, cramping, or bloating in your abdomen.  · You have trouble breathing or you are breathing very quickly.  · Your heart is beating very quickly.  · Your skin feels cold and clammy.  · You feel confused.  · You have pain when you urinate.  · You have signs of dehydration, such as:  ? Dark urine, very little urine, or no urine.  ? Cracked lips.  ? Dry mouth.  ? Sunken eyes.  ? Sleepiness.  ? Weakness.  These symptoms may represent a serious problem that is an emergency. Do not  wait to see if the symptoms will go away. Get medical help right away. Call your local emergency services (911 in the [place] Do not drive yourself to the hospital.  This information is not intended to replace advice given to you by your health care provider. Make sure you discuss any questions you have with your health care provider.  Document Released: 12/18/2006 Document Revised: 05/22/2017 Document Reviewed: 08/23/2016  [employer] Interactive Patient Education © 2018 [employer]    Sinusitis, Adult  Sinusitis is soreness and inflammation of your sinuses. Sinuses are hollow spaces in the bones around your face. Your sinuses are located:  · Around your eyes.  · In the middle of your forehead.  · Behind your nose.  · In your cheekbones.    Your sinuses and nasal passages are lined with a stringy fluid (mucus). Mucus normally drains out of your sinuses. When your nasal tissues become inflamed or swollen, the mucus can become trapped or blocked so air cannot flow through your sinuses. This allows bacteria, viruses, and funguses to grow, which leads to infection.  Sinusitis can develop quickly and last for 7?10 days (acute) or for more than 12 weeks (chronic). Sinusitis often develops after a cold.  What are the causes?  This condition is caused by anything that creates swelling in the sinuses or stops mucus from draining, including:  · Allergies.  · Asthma.  · Bacterial or viral infection.  · Abnormally shaped bones between the nasal passages.  · Nasal growths that contain mucus (nasal polyps).  · Narrow sinus openings.  · Pollutants, such as chemicals or irritants in the air.  · A foreign object stuck in the nose.  · A fungal infection. This is rare.    What increases the risk?  The following factors may make you more likely to develop this condition:  · Having allergies or asthma.  · Having had a recent cold or respiratory tract infection.  · Having structural deformities or blockages in your nose or  sinuses.  · Having a weak immune system.  · Doing a lot of swimming or diving.  · Overusing nasal sprays.  · Smoking.    What are the signs or symptoms?  The main symptoms of this condition are pain and a feeling of pressure around the affected sinuses. Other symptoms include:  · Upper toothache.  · Earache.  · Headache.  · Bad breath.  · Decreased sense of smell and taste.  · A cough that may get worse at night.  · Fatigue.  · Fever.  · Thick drainage from your nose. The drainage is often green and it may contain pus (purulent).  · Stuffy nose or congestion.  · Postnasal drip. This is when extra mucus collects in the throat or back of the nose.  · Swelling and warmth over the affected sinuses.  · Sore throat.  · Sensitivity to light.    How is this diagnosed?  This condition is diagnosed based on symptoms, a medical history, and a physical exam. To find out if your condition is acute or chronic, your health care provider may:  · Look in your nose for signs of nasal polyps.  · Tap over the affected sinus to check for signs of infection.  · View the inside of your sinuses using an imaging device that has a light attached (endoscope).    If your health care provider suspects that you have chronic sinusitis, you may also:  · Be tested for allergies.  · Have a sample of mucus taken from your nose (nasal culture) and checked for bacteria.  · Have a mucus sample examined to see if your sinusitis is related to an allergy.    If your sinusitis does not respond to treatment and it lasts longer than 8 weeks, you may have an MRI or CT scan to check your sinuses. These scans also help to determine how severe your infection is.  In rare cases, a bone biopsy may be done to rule out more serious types of fungal sinus disease.  How is this treated?  Treatment for sinusitis depends on the cause and whether your condition is chronic or acute. If a virus is causing your sinusitis, your symptoms will go away on their own within 10  days. You may be given medicines to relieve your symptoms, including:  · Topical nasal decongestants. They shrink swollen nasal passages and let mucus drain from your sinuses.  · Antihistamines. These drugs block inflammation that is triggered by allergies. This can help to ease swelling in your nose and sinuses.  · Topical nasal corticosteroids. These are nasal sprays that ease inflammation and swelling in your nose and sinuses.  · Nasal saline washes. These rinses can help to get rid of thick mucus in your nose.    If your condition is caused by bacteria, you will be given an antibiotic medicine. If your condition is caused by a fungus, you will be given an antifungal medicine.  Surgery may be needed to correct underlying conditions, such as narrow nasal passages. Surgery may also be needed to remove polyps.  Follow these instructions at home:  Medicines  · Take, use, or apply over-the-counter and prescription medicines only as told by your health care provider. These may include nasal sprays.  · If you were prescribed an antibiotic medicine, take it as told by your health care provider. Do not stop taking the antibiotic even if you start to feel better.  Hydrate and Humidify  · Drink enough water to keep your urine clear or pale yellow. Staying hydrated will help to thin your mucus.  · Use a cool mist humidifier to keep the humidity level in your home above 50%.  · Inhale steam for 10-15 minutes, 3-4 times a day or as told by your health care provider. You can do this in the bathroom while a hot shower is running.  · Limit your exposure to cool or dry air.  Rest  · Rest as much as possible.  · Sleep with your head raised (elevated).  · Make sure to get enough sleep each night.  General instructions  · Apply a warm, moist washcloth to your face 3-4 times a day or as told by your health care provider. This will help with discomfort.  · Wash your hands often with soap and water to reduce your exposure to viruses and  other germs. If soap and water are not available, use hand .  · Do not smoke. Avoid being around people who are smoking (secondhand smoke).  · Keep all follow-up visits as told by your health care provider. This is important.  Contact a health care provider if:  · You have a fever.  · Your symptoms get worse.  · Your symptoms do not improve within 10 days.  Get help right away if:  · You have a severe headache.  · You have persistent vomiting.  · You have pain or swelling around your face or eyes.  · You have vision problems.  · You develop confusion.  · Your neck is stiff.  · You have trouble breathing.  This information is not intended to replace advice given to you by your health care provider. Make sure you discuss any questions you have with your health care provider.  Document Released: 12/18/2006 Document Revised: 08/13/2017 Document Reviewed: 10/12/2016  [employer] Interactive Patient Education © 2018 [employer]  Azithromycin tablets  What is this medicine?  AZITHROMYCIN (az ith jed MYE sin) is a macrolide antibiotic. It is used to treat or prevent certain kinds of bacterial infections. It will not work for colds, flu, or other viral infections.  This medicine may be used for other purposes; ask your health care provider or pharmacist if you have questions.  COMMON BRAND NAME(S): Zithromax, Zithromax Tri-Shane, Zithromax Z-Shane  What should I tell my health care provider before I take this medicine?  They need to know if you have any of these conditions:  -kidney disease  -liver disease  -irregular heartbeat or heart disease  -an unusual or allergic reaction to azithromycin, erythromycin, other macrolide antibiotics, foods, dyes, or preservatives  -pregnant or trying to get pregnant  -breast-feeding  How should I use this medicine?  Take this medicine by mouth with a full glass of water. Follow the directions on the prescription label. The tablets can be taken with food or on an empty stomach. If the  medicine upsets your stomach, take it with food. Take your medicine at regular intervals. Do not take your medicine more often than directed. Take all of your medicine as directed even if you think your are better. Do not skip doses or stop your medicine early.  Talk to your pediatrician regarding the use of this medicine in children. While this drug may be prescribed for children as young as 6 months for selected conditions, precautions do apply.  Overdosage: If you think you have taken too much of this medicine contact a poison control center or emergency room at once.  NOTE: This medicine is only for you. Do not share this medicine with others.  What if I miss a dose?  If you miss a dose, take it as soon as you can. If it is almost time for your next dose, take only that dose. Do not take double or extra doses.  What may interact with this medicine?  Do not take this medicine with any of the following medications:  -lincomycin  This medicine may also interact with the following medications:  -amiodarone  -antacids  -birth control pills  -cyclosporine  -digoxin  -magnesium  -nelfinavir  -phenytoin  -warfarin  This list may not describe all possible interactions. Give your health care provider a list of all the medicines, herbs, non-prescription drugs, or dietary supplements you use. Also tell them if you smoke, drink alcohol, or use illegal drugs. Some items may interact with your medicine.  What should I watch for while using this medicine?  Tell your doctor or healthcare professional if your symptoms do not start to get better or if they get worse.  Do not treat diarrhea with over the counter products. Contact your doctor if you have diarrhea that lasts more than 2 days or if it is severe and watery.  This medicine can make you more sensitive to the sun. Keep out of the sun. If you cannot avoid being in the sun, wear protective clothing and use sunscreen. Do not use sun lamps or tanning beds/booths.  What side  effects may I notice from receiving this medicine?  Side effects that you should report to your doctor or health care professional as soon as possible:  -allergic reactions like skin rash, itching or hives, swelling of the face, lips, or tongue  -confusion, nightmares or hallucinations  -dark urine  -difficulty breathing  -hearing loss  -irregular heartbeat or chest pain  -pain or difficulty passing urine  -redness, blistering, peeling or loosening of the skin, including inside the mouth  -white patches or sores in the mouth  -yellowing of the eyes or skin  Side effects that usually do not require medical attention (report to your doctor or health care professional if they continue or are bothersome):  -diarrhea  -dizziness, drowsiness  -headache  -stomach upset or vomiting  -tooth discoloration  -vaginal irritation  This list may not describe all possible side effects. Call your doctor for medical advice about side effects. You may report side effects to FDA at 7-302-FDA-8522.  Where should I keep my medicine?  Keep out of the reach of children.  Store at room temperature between 15 and 30 degrees C (59 and 86 degrees F). Throw away any unused medicine after the expiration date.  NOTE: This sheet is a summary. It may not cover all possible information. If you have questions about this medicine, talk to your doctor, pharmacist, or health care provider.  © 2018 [employer] (2017-02-14 15:26:03)  Ondansetron tablets  What is this medicine?  ONDANSETRON (on DAN se trudy) is used to treat nausea and vomiting caused by chemotherapy. It is also used to prevent or treat nausea and vomiting after surgery.  This medicine may be used for other purposes; ask your health care provider or pharmacist if you have questions.  COMMON BRAND NAME(S): Zofran  What should I tell my health care provider before I take this medicine?  They need to know if you have any of these conditions:  -heart disease  -history of irregular  heartbeat  -liver disease  -low levels of magnesium or potassium in the blood  -an unusual or allergic reaction to ondansetron, granisetron, other medicines, foods, dyes, or preservatives  -pregnant or trying to get pregnant  -breast-feeding  How should I use this medicine?  Take this medicine by mouth with a glass of water. Follow the directions on your prescription label. Take your doses at regular intervals. Do not take your medicine more often than directed.  Talk to your pediatrician regarding the use of this medicine in children. Special care may be needed.  Overdosage: If you think you have taken too much of this medicine contact a poison control center or emergency room at once.  NOTE: This medicine is only for you. Do not share this medicine with others.  What if I miss a dose?  If you miss a dose, take it as soon as you can. If it is almost time for your next dose, take only that dose. Do not take double or extra doses.  What may interact with this medicine?  Do not take this medicine with any of the following medications:  -apomorphine  -certain medicines for fungal infections like fluconazole, itraconazole, ketoconazole, posaconazole, voriconazole  -cisapride  -dofetilide  -dronedarone  -pimozide  -thioridazine  -ziprasidone  This medicine may also interact with the following medications:  -carbamazepine  -certain medicines for depression, anxiety, or psychotic disturbances  -fentanyl  -linezolid  -MAOIs like Carbex, Eldepryl, Marplan, Nardil, and Parnate  -methylene blue (injected into a vein)  -other medicines that prolong the QT interval (cause an abnormal heart rhythm)  -phenytoin  -rifampicin  -tramadol  This list may not describe all possible interactions. Give your health care provider a list of all the medicines, herbs, non-prescription drugs, or dietary supplements you use. Also tell them if you smoke, drink alcohol, or use illegal drugs. Some items may interact with your medicine.  What should  I watch for while using this medicine?  Check with your doctor or health care professional right away if you have any sign of an allergic reaction.  What side effects may I notice from receiving this medicine?  Side effects that you should report to your doctor or health care professional as soon as possible:  -allergic reactions like skin rash, itching or hives, swelling of the face, lips or tongue  -breathing problems  -confusion  -dizziness  -fast or irregular heartbeat  -feeling faint or lightheaded, falls  -fever and chills  -loss of balance or coordination  -seizures  -sweating  -swelling of the hands or feet  -tightness in the chest  -tremors  -unusually weak or tired  Side effects that usually do not require medical attention (report to your doctor or health care professional if they continue or are bothersome):  -constipation or diarrhea  -headache  This list may not describe all possible side effects. Call your doctor for medical advice about side effects. You may report side effects to FDA at 4-202-SJA-2104.  Where should I keep my medicine?  Keep out of the reach of children.  Store between 2 and 30 degrees C (36 and 86 degrees F). Throw away any unused medicine after the expiration date.  NOTE: This sheet is a summary. It may not cover all possible information. If you have questions about this medicine, talk to your doctor, pharmacist, or health care provider.  © 2018 Elsevier/Gold Standard (2014-09-24 16:27:45)

## 2019-01-17 RX ORDER — ATORVASTATIN CALCIUM 10 MG/1
TABLET, FILM COATED ORAL
Qty: 90 TABLET | Refills: 1 | Status: SHIPPED | OUTPATIENT
Start: 2019-01-17 | End: 2019-07-12 | Stop reason: SDUPTHER

## 2019-01-18 RX ORDER — ESCITALOPRAM OXALATE 10 MG/1
TABLET ORAL
Qty: 90 TABLET | Refills: 1 | Status: SHIPPED | OUTPATIENT
Start: 2019-01-18 | End: 2019-09-09 | Stop reason: SDUPTHER

## 2019-06-28 ENCOUNTER — OFFICE VISIT (OUTPATIENT)
Dept: FAMILY MEDICINE CLINIC | Facility: CLINIC | Age: 64
End: 2019-06-28

## 2019-06-28 VITALS
BODY MASS INDEX: 25.76 KG/M2 | SYSTOLIC BLOOD PRESSURE: 130 MMHG | HEART RATE: 59 BPM | OXYGEN SATURATION: 98 % | DIASTOLIC BLOOD PRESSURE: 62 MMHG | RESPIRATION RATE: 21 BRPM | HEIGHT: 62 IN | TEMPERATURE: 97.4 F | WEIGHT: 140 LBS

## 2019-06-28 DIAGNOSIS — N76.0 ACUTE VAGINITIS: Primary | ICD-10-CM

## 2019-06-28 PROCEDURE — 99214 OFFICE O/P EST MOD 30 MIN: CPT | Performed by: NURSE PRACTITIONER

## 2019-06-28 RX ORDER — NYSTATIN 100000 U/G
OINTMENT TOPICAL 2 TIMES DAILY
Qty: 30 G | Refills: 0 | Status: SHIPPED | OUTPATIENT
Start: 2019-06-28 | End: 2019-07-05

## 2019-06-28 RX ORDER — FLUCONAZOLE 150 MG/1
150 TABLET ORAL ONCE
Qty: 1 TABLET | Refills: 0 | Status: SHIPPED | OUTPATIENT
Start: 2019-06-28 | End: 2019-06-28

## 2019-06-28 RX ORDER — DULAGLUTIDE 1.5 MG/.5ML
INJECTION, SOLUTION SUBCUTANEOUS
Refills: 0 | COMMUNITY
Start: 2019-04-29 | End: 2022-04-05

## 2019-06-28 NOTE — PATIENT INSTRUCTIONS
Vaginitis  Vaginitis is a condition in which the vaginal tissue swells and becomes red (inflamed). This condition is most often caused by a change in the normal balance of bacteria and yeast that live in the vagina. This change causes an overgrowth of certain bacteria or yeast, which causes the inflammation. There are different types of vaginitis, but the most common types are:  · Bacterial vaginosis.  · Yeast infection (candidiasis).  · Trichomoniasis vaginitis. This is a sexually transmitted disease (STD).  · Viral vaginitis.  · Atrophic vaginitis.  · Allergic vaginitis.    What are the causes?  The cause of this condition depends on the type of vaginitis. It can be caused by:  · Bacteria (bacterial vaginosis).  · Yeast, which is a fungus (yeast infection).  · A parasite (trichomoniasis vaginitis).  · A virus (viral vaginitis).  · Low hormone levels (atrophic vaginitis). Low hormone levels can occur during pregnancy, breastfeeding, or after menopause.  · Irritants, such as bubble baths, scented tampons, and feminine sprays (allergic vaginitis).    Other factors can change the normal balance of the yeast and bacteria that live in the vagina. These include:  · Antibiotic medicines.  · Poor hygiene.  · Diaphragms, vaginal sponges, spermicides, birth control pills, and intrauterine devices (IUD).  · Sex.  · Infection.  · Uncontrolled diabetes.  · A weakened defense (immune) system.    What increases the risk?  This condition is more likely to develop in women who:  · Smoke.  · Use vaginal douches, scented tampons, or scented sanitary pads.  · Wear tight-fitting pants.  · Wear thong underwear.  · Use oral birth control pills or an IUD.  · Have sex without a condom.  · Have multiple sex partners.  · Have an STD.  · Frequently use the spermicide nonoxynol-9.  · Eat lots of foods high in sugar.  · Have uncontrolled diabetes.  · Have low estrogen levels.  · Have a weakened immune system from an immune disorder or medical  treatment.  · Are pregnant or breastfeeding.    What are the signs or symptoms?  Symptoms vary depending on the cause of the vaginitis. Common symptoms include:  · Abnormal vaginal discharge.  ? The discharge is white, gray, or yellow with bacterial vaginosis.  ? The discharge is thick, white, and cheesy with a yeast infection.  ? The discharge is frothy and yellow or greenish with trichomoniasis.  · A bad vaginal smell. The smell is fishy with bacterial vaginosis.  · Vaginal itching, pain, or swelling.  · Sex that is painful.  · Pain or burning when urinating.    Sometimes there are no symptoms.  How is this diagnosed?  This condition is diagnosed based on your symptoms and medical history. A physical exam, including a pelvic exam, will also be done. You may also have other tests, including:  · Tests to determine the pH level (acidity or alkalinity) of your vagina.  · A whiff test, to assess the odor that results when a sample of your vaginal discharge is mixed with a potassium hydroxide solution.  · Tests of vaginal fluid. A sample will be examined under a microscope.    How is this treated?  Treatment varies depending on the type of vaginitis you have. Your treatment may include:  · Antibiotic creams or pills to treat bacterial vaginosis and trichomoniasis.  · Antifungal medicines, such as vaginal creams or suppositories, to treat a yeast infection.  · Medicine to ease discomfort if you have viral vaginitis. Your sexual partner should also be treated.  · Estrogen delivered in a cream, pill, suppository, or vaginal ring to treat atrophic vaginitis. If vaginal dryness occurs, lubricants and moisturizing creams may help. You may need to avoid scented soaps, sprays, or douches.  · Stopping use of a product that is causing allergic vaginitis. Then using a vaginal cream to treat the symptoms.    Follow these instructions at home:  Lifestyle  · Keep your genital area clean and dry. Avoid soap, and only rinse the area  with water.  · Do not douche or use tampons until your health care provider says it is okay to do so. Use sanitary pads, if needed.  · Do not have sex until your health care provider approves. When you can return to sex, practice safe sex and use condoms.  · Wipe from front to back. This avoids the spread of bacteria from the rectum to the vagina.  General instructions  · Take over-the-counter and prescription medicines only as told by your health care provider.  · If you were prescribed an antibiotic medicine, take or use it as told by your health care provider. Do not stop taking or using the antibiotic even if you start to feel better.  · Keep all follow-up visits as told by your health care provider. This is important.  How is this prevented?  · Use mild, non-scented products. Do not use things that can irritate the vagina, such as fabric softeners. Avoid the following products if they are scented:  ? Feminine sprays.  ? Detergents.  ? Tampons.  ? Feminine hygiene products.  ? Soaps or bubble baths.  · Let air reach your genital area.  ? Wear cotton underwear to reduce moisture buildup.  ? Avoid wearing underwear while you sleep.  ? Avoid wearing tight pants and underwear or nylons without a cotton panel.  ? Avoid wearing thong underwear.  · Take off any wet clothing, such as bathing suits, as soon as possible.  · Practice safe sex and use condoms.  Contact a health care provider if:  · You have abdominal pain.  · You have a fever.  · You have symptoms that last for more than 2-3 days.  Get help right away if:  · You have a fever and your symptoms suddenly get worse.  Summary  · Vaginitis is a condition in which the vaginal tissue becomes inflamed.This condition is most often caused by a change in the normal balance of bacteria and yeast that live in the vagina.  · Treatment varies depending on the type of vaginitis you have.  · Do not douche, use tampons , or have sex until your health care provider approves.  When you can return to sex, practice safe sex and use condoms.  This information is not intended to replace advice given to you by your health care provider. Make sure you discuss any questions you have with your health care provider.  Document Released: 10/14/2008 Document Revised: 01/23/2018 Document Reviewed: 01/23/2018  [employer] Interactive Patient Education © 2019 [employer]  Nystatin skin cream or ointment  What is this medicine?  NYSTATIN (isma STAT in) is an antifungal medicine. It is used to treat certain kinds of fungal or yeast infections of the skin.  This medicine may be used for other purposes; ask your health care provider or pharmacist if you have questions.  COMMON BRAND NAME(S): Mycostatin, Nystex, Pediaderm AF  What should I tell my health care provider before I take this medicine?  They need to know if you have any of these conditions:  -an unusual or allergic reaction to nystatin, other foods, dyes or preservatives  -pregnant or trying to get pregnant  -breast-feeding  How should I use this medicine?  This medicine is for external use on the skin only. Follow the directions on the prescription label. Wash hands before and after use. If treating a hand or nail infection, wash hands before use only. Apply a thin layer of this medicine to cover the affected skin and surrounding area. You can cover the area with a sterile gauze dressing (bandage). Do not use an airtight bandage (such as a plastic-covered bandage). Do not get the medicine in your eyes. If you do, rinse out with plenty of cool tap water. Use the full course of treatment prescribed, even if you think the infection is getting better. Use at regular intervals. Do not use your medicine more often than directed. Do not use this medicine for any condition other than the one for which it was prescribed.  Talk to your pediatrician regarding the use of this medicine in children. Special care may be needed.  Overdosage: If you think you have  taken too much of this medicine contact a poison control center or emergency room at once.  NOTE: This medicine is only for you. Do not share this medicine with others.  What if I miss a dose?  If you miss a dose, use it as soon as you can. If it is almost time for your next dose, use only that dose. Do not use double or extra doses.  What may interact with this medicine?  Interactions are not expected. Do not use any other skin products on the affected area without telling your doctor or health care professional.  This list may not describe all possible interactions. Give your health care provider a list of all the medicines, herbs, non-prescription drugs, or dietary supplements you use. Also tell them if you smoke, drink alcohol, or use illegal drugs. Some items may interact with your medicine.  What should I watch for while using this medicine?  Tell your doctor or health care professional if your symptoms do not improve after 3 days.  After bathing make sure that your skin is very dry. Fungal infections like moist conditions. Do not walk around barefoot.  To help prevent reinfection, wear freshly washed cotton, not synthetic, clothing.  What side effects may I notice from receiving this medicine?  Side effects that usually do not require medical attention (report to your doctor or health care professional if they continue or are bothersome):  -skin irritation  This list may not describe all possible side effects. Call your doctor for medical advice about side effects. You may report side effects to FDA at 7-476-FDA-9031.  Where should I keep my medicine?  Keep out of the reach of children.  Store at room temperature 15 to 30 degrees C (59 to 86 degrees F). Throw away any unused medicine after the expiration date.  NOTE: This sheet is a summary. It may not cover all possible information. If you have questions about this medicine, talk to your doctor, pharmacist, or health care provider.  © 2019 [employer]/Gold  Standard (2017-01-18 16:28:30)

## 2019-06-29 NOTE — PROGRESS NOTES
Subjective   Idania Mccray is a 63 y.o. female.     Ms. Mccray presents today with c/o vaginal and vulvar itching and irritation for past 3 days. She denies recent antibiotic use or urinary symptoms.       Vaginitis   This is a new problem. Episode onset: 3 days. The problem occurs daily. The problem has been gradually worsening. Pertinent negatives include no abdominal pain, change in bowel habit, chills, diaphoresis, fatigue, fever, myalgias, nausea, rash, urinary symptoms or vomiting. Nothing aggravates the symptoms. Treatments tried: Vagisil. The treatment provided no relief.       The following portions of the patient's history were reviewed and updated as appropriate: allergies, current medications, past family history, past medical history, past social history, past surgical history and problem list.    Allergies as of 06/28/2019 - Reviewed 06/28/2019   Allergen Reaction Noted   • Augmentin [amoxicillin-pot clavulanate] Diarrhea 08/26/2016       Current Outpatient Medications on File Prior to Visit   Medication Sig   • ALPRAZolam (XANAX) 0.25 MG tablet Take 0.25 mg by mouth As Needed.   • aspirin 81 MG EC tablet Take 81 mg by mouth Daily.   • atorvastatin (LIPITOR) 10 MG tablet TAKE 1 TABLET BY MOUTH EVERY DAY   • Blood Glucose Monitoring Suppl (ONE TOUCH ULTRA MINI) w/Device kit USE AS DIRECTED [E11.9]   • escitalopram (LEXAPRO) 10 MG tablet TAKE 1 TABLET BY MOUTH EVERY DAY   • hepatitis A (HAVRIX) 1440 EL U/ML vaccine Inject 1 mL into the appropriate muscle as directed by prescriber. Repeat in 6 months   • Insulin Glargine (BASAGLAR KWIKPEN) 100 UNIT/ML injection pen Inject 25 Units under the skin into the appropriate area as directed Every Night.   • JANUVIA 100 MG tablet Take 100 mg by mouth Daily.   • levothyroxine (SYNTHROID, LEVOTHROID) 75 MCG tablet Take 75 mcg by mouth Daily.   • lisinopril (PRINIVIL,ZESTRIL) 20 MG tablet Take 1 tablet by mouth Daily.   • metFORMIN ER (GLUCOPHAGE-XR) 500 MG 24 hr  tablet Take 500 mg by mouth 2 (Two) Times a Day.   • ondansetron ODT (ZOFRAN-ODT) 4 MG disintegrating tablet Take 1 tablet by mouth Every 8 (Eight) Hours As Needed for Nausea or Vomiting.   • ONE TOUCH ULTRA TEST test strip 1 each by Other route 2 (Two) Times a Day. Use as instructed   • ONETOUCH DELICA LANCETS FINE misc USE TO TEST TWICE A DAY [E11.9]   • TRULICITY 1.5 MG/0.5ML solution pen-injector INJECT AS DIRECTED ONCE A WEEK     No current facility-administered medications on file prior to visit.        Review of Systems   Constitutional: Negative for chills, diaphoresis, fatigue and fever.   Respiratory: Negative.    Cardiovascular: Negative.    Gastrointestinal: Negative for abdominal pain, change in bowel habit, nausea and vomiting.   Genitourinary: Negative for dysuria, flank pain, frequency, hematuria, pelvic pain, urgency and vaginal discharge. Vaginal pain: vaginal irritation, itching, vular itching and irritation.   Musculoskeletal: Negative for back pain and myalgias.   Skin: Negative for rash.       Objective   Physical Exam   Constitutional: She is oriented to person, place, and time. Vital signs are normal. She appears well-developed and well-nourished. She is cooperative.  Non-toxic appearance. She does not have a sickly appearance. She does not appear ill. No distress.   HENT:   Right Ear: Tympanic membrane normal.   Left Ear: Tympanic membrane normal.   Mouth/Throat: Uvula is midline and mucous membranes are normal.   Cardiovascular: Normal rate, regular rhythm and normal heart sounds.   Pulmonary/Chest: Effort normal and breath sounds normal.   Abdominal: Normal appearance. There is no tenderness. There is no rigidity, no guarding and no CVA tenderness.   Neurological: She is alert and oriented to person, place, and time.   Skin: Skin is warm, dry and intact. No rash noted. She is not diaphoretic.   Psychiatric: She has a normal mood and affect. Her behavior is normal. Judgment and thought  content normal.   Vitals reviewed.    Vitals:    06/28/19 1731   BP: 130/62   Pulse: 59   Resp: 21   Temp: 97.4 °F (36.3 °C)   SpO2: 98%     Body mass index is 25.61 kg/m².    Assessment/Plan   Idania was seen today for vaginitis.    Diagnoses and all orders for this visit:    Acute vaginitis  -     fluconazole (DIFLUCAN) 150 MG tablet; Take 1 tablet by mouth 1 (One) Time for 1 dose.  -     nystatin (MYCOSTATIN) 471915 UNIT/GM ointment; Apply  topically to the appropriate area as directed 2 (Two) Times a Day for 7 days. Apply topically to affected area    Discussed the nature of the medical condition(s) risks, complications, implications, management, safe and proper use of medications. Encouraged medication compliance, and keeping scheduled follow up appointments with me and any other providers.      RTC if symptoms fail to improve

## 2019-07-12 RX ORDER — ATORVASTATIN CALCIUM 10 MG/1
TABLET, FILM COATED ORAL
Qty: 90 TABLET | Refills: 0 | Status: SHIPPED | OUTPATIENT
Start: 2019-07-12 | End: 2019-10-13 | Stop reason: SDUPTHER

## 2019-07-30 ENCOUNTER — OFFICE VISIT (OUTPATIENT)
Dept: FAMILY MEDICINE CLINIC | Facility: CLINIC | Age: 64
End: 2019-07-30

## 2019-07-30 VITALS
HEIGHT: 62 IN | WEIGHT: 133 LBS | BODY MASS INDEX: 24.48 KG/M2 | TEMPERATURE: 97.8 F | RESPIRATION RATE: 11 BRPM | OXYGEN SATURATION: 97 % | DIASTOLIC BLOOD PRESSURE: 64 MMHG | SYSTOLIC BLOOD PRESSURE: 104 MMHG | HEART RATE: 82 BPM

## 2019-07-30 DIAGNOSIS — J01.40 ACUTE PANSINUSITIS, RECURRENCE NOT SPECIFIED: Primary | ICD-10-CM

## 2019-07-30 PROCEDURE — 99213 OFFICE O/P EST LOW 20 MIN: CPT | Performed by: NURSE PRACTITIONER

## 2019-07-30 RX ORDER — AZITHROMYCIN 250 MG/1
TABLET, FILM COATED ORAL
Qty: 6 TABLET | Refills: 0 | Status: SHIPPED | OUTPATIENT
Start: 2019-07-30 | End: 2019-08-04

## 2019-07-30 NOTE — PROGRESS NOTES
"Subjective   Idania Mccray is a 63 y.o. female.   Chief Complaint   Patient presents with   • Sinus Problem     Pt woke up yesterday with a irratated throat, headache, popping ears, runny nose, lots of clear drainage, and a hacking cough.       History of Present Illness   Patient is here for complaint of sore throat, HA, sinus pain and pressure, tenderness,  cough, ears are popping x 3 days. Drainage.  Denies fever.     The following portions of the patient's history were reviewed and updated as appropriate: allergies, current medications, past family history, past medical history, past social history, past surgical history and problem list.    Review of Systems   Constitutional: Positive for activity change, appetite change and fatigue. Negative for chills and fever.   HENT: Positive for congestion, ear pain, sinus pressure, sinus pain and sore throat.         Yellow drainage    Respiratory: Positive for cough. Negative for wheezing.    Cardiovascular: Negative.    Gastrointestinal: Negative.    Musculoskeletal: Negative.    Skin: Negative.    Allergic/Immunologic: Positive for environmental allergies.   Neurological: Positive for headaches.   Hematological: Negative.    Psychiatric/Behavioral: Negative.      Past Surgical History:   Procedure Laterality Date   •  SECTION     • TONSILLECTOMY       Past Medical History:   Diagnosis Date   • Depression    • Diabetes mellitus (CMS/HCC)    • Disease of thyroid gland    • Hyperlipidemia    • Hypertension        Objective   Allergies   Allergen Reactions   • Augmentin [Amoxicillin-Pot Clavulanate] Diarrhea     Visit Vitals  /64   Pulse 82   Temp 97.8 °F (36.6 °C)   Resp 11   Ht 157.5 cm (62\")   Wt 60.3 kg (133 lb)   LMP  (LMP Unknown)   SpO2 97%   BMI 24.33 kg/m²       Physical Exam   Constitutional: She is oriented to person, place, and time. Vital signs are normal. She appears well-developed and well-nourished. She is cooperative. She appears ill. "   HENT:   Head: Normocephalic.   Right Ear: Hearing, tympanic membrane, external ear and ear canal normal.   Left Ear: Hearing, tympanic membrane, external ear and ear canal normal.   Nose: Mucosal edema and sinus tenderness present. Right sinus exhibits maxillary sinus tenderness and frontal sinus tenderness. Left sinus exhibits maxillary sinus tenderness and frontal sinus tenderness.   Mouth/Throat: Uvula is midline and mucous membranes are normal. Posterior oropharyngeal erythema present.   Yellow purulent discharge    Eyes: Pupils are equal, round, and reactive to light.   Neck: Normal range of motion.   Cardiovascular: Normal rate and regular rhythm.   Pulmonary/Chest: Effort normal and breath sounds normal.   Neurological: She is alert and oriented to person, place, and time.   Skin: Skin is warm, dry and intact. Capillary refill takes less than 2 seconds.   Psychiatric: She has a normal mood and affect. Her behavior is normal.   Vitals reviewed.      Assessment/Plan   Idania was seen today for sinus problem.    Diagnoses and all orders for this visit:    Acute pansinusitis, recurrence not specified  -     azithromycin (ZITHROMAX) 250 MG tablet; Take 2 tablets the first day, then 1 tablet daily for 4 days.  -     Chlorcyclizine-Pseudoephed 25-60 MG tablet; Take 25 mg by mouth 2 (Two) Times a Day.      Gargle warm salt water 1/4 teaspoon salt in 4 oz.warm water twice daily as needed.   Change toothbrush in 1 week   Avoid dairy products - this can increase your congestion.     Follow up with your PCP as needed.                Patient Instructions   Sinusitis, Adult  Sinusitis is soreness and swelling (inflammation) of your sinuses. Sinuses are hollow spaces in the bones around your face. They are located:  · Around your eyes.  · In the middle of your forehead.  · Behind your nose.  · In your cheekbones.    Your sinuses and nasal passages are lined with a stringy fluid (mucus). Mucus normally drains out of your  sinuses. Swelling can trap mucus in your sinuses. This lets germs like bacteria or viruses grow, and that leads to infection. Most of the time, sinusitis is caused by a virus.  If bacteria is causing your infection, your doctor may have you wait and see if you get better without antibiotic medicine. You may be prescribed antibiotics if you have:  · A very bad infection.  · A weak disease-fighting (immune) system.    Follow these instructions at home:  Medicines  · Take, use, or apply over-the-counter and prescription medicines only as told by your doctor. These may include nasal sprays.  · If you were prescribed an antibiotic, take it as told by your doctor. Do not stop taking the antibiotic even if you start to feel better.  Hydrate and Humidify  · Drink enough water to keep your pee (urine) pale yellow.  · Use a cool mist humidifier to keep the humidity level in your home above 50%.  · Breathe in steam for 10-15 minutes, 3-4 times a day or as told by your doctor. You can do this in the bathroom while a hot shower is running.  · Try not to spend time in cool or dry air.  Rest  · Rest as much as possible.  · Sleep with your head raised (elevated).  · Make sure to get enough sleep each night.  General instructions  · Put a warm, moist washcloth on your face 3-4 times a day, or as often as told by your doctor. This will help with discomfort.  · Wash your hands often with soap and water. If there is no soap and water, use hand .  · Do not smoke. Avoid being around people who are smoking (secondhand smoke).  · Keep all follow-up visits as told by your doctor. This is important.  Contact a doctor if:  · You have a fever.  · Your symptoms get worse.  · Your symptoms do not get better within 10 days.  Get help right away if:  · You have a very bad headache.  · You cannot stop throwing up (vomiting).  · You have pain or swelling around your face or eyes.  · You have trouble seeing.  · You feel confused.  · Your  neck is stiff.  · You have trouble breathing.  This information is not intended to replace advice given to you by your health care provider. Make sure you discuss any questions you have with your health care provider.  Document Released: 06/05/2009 Document Revised: 06/29/2018 Document Reviewed: 10/12/2016  [employer] Interactive Patient Education © 2019 [employer]        Catalina Gary, APRN

## 2019-07-30 NOTE — PATIENT INSTRUCTIONS

## 2019-08-22 ENCOUNTER — HOSPITAL ENCOUNTER (EMERGENCY)
Facility: HOSPITAL | Age: 64
Discharge: HOME | End: 2019-08-22
Attending: EMERGENCY MEDICINE | Admitting: EMERGENCY MEDICINE

## 2019-08-22 ENCOUNTER — APPOINTMENT (OUTPATIENT)
Dept: CT IMAGING | Facility: HOSPITAL | Age: 64
End: 2019-08-22

## 2019-08-22 VITALS
HEIGHT: 62 IN | OXYGEN SATURATION: 95 % | HEART RATE: 81 BPM | TEMPERATURE: 98.3 F | BODY MASS INDEX: 23.55 KG/M2 | RESPIRATION RATE: 20 BRPM | SYSTOLIC BLOOD PRESSURE: 101 MMHG | WEIGHT: 128 LBS | DIASTOLIC BLOOD PRESSURE: 57 MMHG

## 2019-08-22 DIAGNOSIS — E11.65 UNCONTROLLED TYPE 2 DIABETES MELLITUS WITH HYPERGLYCEMIA (HCC): Primary | ICD-10-CM

## 2019-08-22 LAB
ALBUMIN SERPL-MCNC: 4.1 G/DL (ref 3.5–5.2)
ALBUMIN/GLOB SERPL: 1.8 G/DL
ALP SERPL-CCNC: 65 U/L (ref 39–117)
ALT SERPL W P-5'-P-CCNC: 17 U/L (ref 1–33)
ANION GAP SERPL CALCULATED.3IONS-SCNC: 13 MMOL/L (ref 5–15)
AST SERPL-CCNC: 17 U/L (ref 1–32)
ATMOSPHERIC PRESS: ABNORMAL MM[HG]
B-OH-BUTYR SERPL-SCNC: 0.41 MMOL/L (ref 0.02–0.27)
BASE EXCESS BLDV CALC-SCNC: 4.6 MMOL/L (ref -2–2)
BASOPHILS # BLD AUTO: 0.01 10*3/MM3 (ref 0–0.2)
BASOPHILS NFR BLD AUTO: 0.3 % (ref 0–1.5)
BDY SITE: ABNORMAL
BILIRUB SERPL-MCNC: 0.4 MG/DL (ref 0.2–1.2)
BILIRUB UR QL STRIP: NEGATIVE
BODY TEMPERATURE: 98.6 C
BUN BLD-MCNC: 14 MG/DL (ref 8–23)
BUN/CREAT SERPL: 17.5 (ref 7–25)
CALCIUM SPEC-SCNC: 9.9 MG/DL (ref 8.6–10.5)
CHLORIDE SERPL-SCNC: 92 MMOL/L (ref 98–107)
CLARITY UR: CLEAR
CO2 BLDA-SCNC: 31.6 MMOL/L (ref 23–27)
CO2 SERPL-SCNC: 26 MMOL/L (ref 22–29)
COHGB MFR BLD: 0.9 %
COLOR UR: YELLOW
CREAT BLD-MCNC: 0.8 MG/DL (ref 0.57–1)
DEPRECATED RDW RBC AUTO: 44.6 FL (ref 37–54)
EOSINOPHIL # BLD AUTO: 0 10*3/MM3 (ref 0–0.4)
EOSINOPHIL NFR BLD AUTO: 0 % (ref 0.3–6.2)
ERYTHROCYTE [DISTWIDTH] IN BLOOD BY AUTOMATED COUNT: 13.5 % (ref 12.3–15.4)
GFR SERPL CREATININE-BSD FRML MDRD: 72 ML/MIN/1.73
GLOBULIN UR ELPH-MCNC: 2.3 GM/DL
GLUCOSE BLD-MCNC: 396 MG/DL (ref 65–99)
GLUCOSE BLDC GLUCOMTR-MCNC: 158 MG/DL (ref 70–130)
GLUCOSE BLDC GLUCOMTR-MCNC: 398 MG/DL (ref 70–130)
GLUCOSE UR STRIP-MCNC: ABNORMAL MG/DL
HCO3 BLDV-SCNC: 30.1 MMOL/L (ref 22–28)
HCT VFR BLD AUTO: 36.8 % (ref 34–46.6)
HGB BLD-MCNC: 12.2 G/DL (ref 12–15.9)
HGB BLDA-MCNC: 12.4 G/DL (ref 14–18)
HGB UR QL STRIP.AUTO: NEGATIVE
HOROWITZ INDEX BLD+IHG-RTO: 21 %
IMM GRANULOCYTES # BLD AUTO: 0 10*3/MM3 (ref 0–0.05)
IMM GRANULOCYTES NFR BLD AUTO: 0 % (ref 0–0.5)
KETONES UR QL STRIP: NEGATIVE
LEUKOCYTE ESTERASE UR QL STRIP.AUTO: NEGATIVE
LYMPHOCYTES # BLD AUTO: 1 10*3/MM3 (ref 0.7–3.1)
LYMPHOCYTES NFR BLD AUTO: 31.3 % (ref 19.6–45.3)
Lab: ABNORMAL
MCH RBC QN AUTO: 29.8 PG (ref 26.6–33)
MCHC RBC AUTO-ENTMCNC: 33.2 G/DL (ref 31.5–35.7)
MCV RBC AUTO: 90 FL (ref 79–97)
METHGB BLD QL: 1 %
MODALITY: ABNORMAL
MONOCYTES # BLD AUTO: 0.31 10*3/MM3 (ref 0.1–0.9)
MONOCYTES NFR BLD AUTO: 9.7 % (ref 5–12)
NEUTROPHILS # BLD AUTO: 1.87 10*3/MM3 (ref 1.7–7)
NEUTROPHILS NFR BLD AUTO: 58.7 % (ref 42.7–76)
NITRITE UR QL STRIP: NEGATIVE
NOTE: ABNORMAL
NOTIFIED BY: ABNORMAL
NOTIFIED WHO: ABNORMAL
NRBC BLD AUTO-RTO: 0 /100 WBC (ref 0–0.2)
NT-PROBNP SERPL-MCNC: 217.4 PG/ML (ref 5–900)
OXYHGB MFR BLDV: 42.9 %
PCO2 BLDV: 47.6 MM HG (ref 41–51)
PH BLDV: 7.41 PH UNITS
PH UR STRIP.AUTO: 5.5 [PH] (ref 5–8)
PLATELET # BLD AUTO: 132 10*3/MM3 (ref 140–450)
PMV BLD AUTO: 12.5 FL (ref 6–12)
PO2 BLDV: 23.8 MM HG (ref 27–53)
POTASSIUM BLD-SCNC: 4.8 MMOL/L (ref 3.5–5.2)
PROT SERPL-MCNC: 6.4 G/DL (ref 6–8.5)
PROT UR QL STRIP: NEGATIVE
RBC # BLD AUTO: 4.09 10*6/MM3 (ref 3.77–5.28)
SODIUM BLD-SCNC: 131 MMOL/L (ref 136–145)
SP GR UR STRIP: 1.01 (ref 1–1.03)
TROPONIN T SERPL-MCNC: <0.01 NG/ML (ref 0–0.03)
UROBILINOGEN UR QL STRIP: ABNORMAL
VENTILATOR MODE: ABNORMAL
WBC NRBC COR # BLD: 3.19 10*3/MM3 (ref 3.4–10.8)

## 2019-08-22 PROCEDURE — 85025 COMPLETE CBC W/AUTO DIFF WBC: CPT

## 2019-08-22 PROCEDURE — 82820 HEMOGLOBIN-OXYGEN AFFINITY: CPT

## 2019-08-22 PROCEDURE — 82962 GLUCOSE BLOOD TEST: CPT

## 2019-08-22 PROCEDURE — 84484 ASSAY OF TROPONIN QUANT: CPT | Performed by: NURSE PRACTITIONER

## 2019-08-22 PROCEDURE — 25010000002 IOPAMIDOL 61 % SOLUTION: Performed by: EMERGENCY MEDICINE

## 2019-08-22 PROCEDURE — 80053 COMPREHEN METABOLIC PANEL: CPT

## 2019-08-22 PROCEDURE — 81003 URINALYSIS AUTO W/O SCOPE: CPT | Performed by: NURSE PRACTITIONER

## 2019-08-22 PROCEDURE — 83880 ASSAY OF NATRIURETIC PEPTIDE: CPT | Performed by: NURSE PRACTITIONER

## 2019-08-22 PROCEDURE — 74177 CT ABD & PELVIS W/CONTRAST: CPT

## 2019-08-22 PROCEDURE — 63710000001 INSULIN REGULAR HUMAN PER 5 UNITS: Performed by: NURSE PRACTITIONER

## 2019-08-22 PROCEDURE — 99284 EMERGENCY DEPT VISIT MOD MDM: CPT

## 2019-08-22 PROCEDURE — 82805 BLOOD GASES W/O2 SATURATION: CPT

## 2019-08-22 PROCEDURE — 82010 KETONE BODYS QUAN: CPT | Performed by: NURSE PRACTITIONER

## 2019-08-22 PROCEDURE — 93005 ELECTROCARDIOGRAM TRACING: CPT | Performed by: NURSE PRACTITIONER

## 2019-08-22 RX ORDER — SODIUM CHLORIDE 0.9 % (FLUSH) 0.9 %
10 SYRINGE (ML) INJECTION AS NEEDED
Status: DISCONTINUED | OUTPATIENT
Start: 2019-08-22 | End: 2019-08-23 | Stop reason: HOSPADM

## 2019-08-22 RX ADMIN — SODIUM CHLORIDE 1000 ML: 9 INJECTION, SOLUTION INTRAVENOUS at 20:02

## 2019-08-22 RX ADMIN — IOPAMIDOL 85 ML: 612 INJECTION, SOLUTION INTRAVENOUS at 20:33

## 2019-08-22 RX ADMIN — INSULIN HUMAN 6 UNITS: 100 INJECTION, SOLUTION PARENTERAL at 20:03

## 2019-08-26 ENCOUNTER — OFFICE VISIT (OUTPATIENT)
Dept: FAMILY MEDICINE CLINIC | Facility: CLINIC | Age: 64
End: 2019-08-26

## 2019-08-26 VITALS
BODY MASS INDEX: 23.92 KG/M2 | DIASTOLIC BLOOD PRESSURE: 60 MMHG | OXYGEN SATURATION: 98 % | RESPIRATION RATE: 16 BRPM | WEIGHT: 130 LBS | SYSTOLIC BLOOD PRESSURE: 96 MMHG | HEIGHT: 62 IN | HEART RATE: 78 BPM

## 2019-08-26 DIAGNOSIS — Z79.4 CONTROLLED TYPE 2 DIABETES MELLITUS WITHOUT COMPLICATION, WITH LONG-TERM CURRENT USE OF INSULIN (HCC): Primary | ICD-10-CM

## 2019-08-26 DIAGNOSIS — E11.9 CONTROLLED TYPE 2 DIABETES MELLITUS WITHOUT COMPLICATION, WITHOUT LONG-TERM CURRENT USE OF INSULIN (HCC): ICD-10-CM

## 2019-08-26 DIAGNOSIS — E11.9 CONTROLLED TYPE 2 DIABETES MELLITUS WITHOUT COMPLICATION, WITH LONG-TERM CURRENT USE OF INSULIN (HCC): Primary | ICD-10-CM

## 2019-08-26 PROCEDURE — 99214 OFFICE O/P EST MOD 30 MIN: CPT | Performed by: FAMILY MEDICINE

## 2019-08-26 RX ORDER — INSULIN DEGLUDEC INJECTION 100 U/ML
23 INJECTION, SOLUTION SUBCUTANEOUS NIGHTLY
Refills: 5 | COMMUNITY
Start: 2019-08-20 | End: 2021-01-14 | Stop reason: SDUPTHER

## 2019-08-26 RX ORDER — PEN NEEDLE, DIABETIC 32GX 5/32"
NEEDLE, DISPOSABLE MISCELLANEOUS DAILY
Refills: 1 | COMMUNITY
Start: 2019-08-20 | End: 2020-10-19 | Stop reason: SDUPTHER

## 2019-08-26 RX ORDER — LISINOPRIL 20 MG/1
10 TABLET ORAL DAILY
Qty: 90 TABLET | Refills: 1 | Status: SHIPPED
Start: 2019-08-26 | End: 2020-06-30 | Stop reason: SDUPTHER

## 2019-08-27 NOTE — PROGRESS NOTES
"Subjective   Idania Mccray is a 63 y.o. female.     Hyperglycemia   This is a new problem. The current episode started 1 to 4 weeks ago. The problem occurs intermittently. The problem has been gradually improving. Associated symptoms include fatigue. Pertinent negatives include no chest pain, chills, diaphoresis, fever, nausea, numbness, rash, vomiting or weakness. Nothing aggravates the symptoms. Treatments tried: er visit with lab and extra insulin rx. The treatment provided moderate relief.        The following portions of the patient's history were reviewed and updated as appropriate: allergies, current medications, past social history and problem list.    Review of Systems   Constitutional: Positive for fatigue. Negative for appetite change, chills, diaphoresis, fever and unexpected weight change.   Eyes: Negative for visual disturbance.   Respiratory: Negative for chest tightness and shortness of breath.    Cardiovascular: Negative for chest pain, palpitations and leg swelling.   Gastrointestinal: Negative for diarrhea, nausea and vomiting.   Endocrine: Negative for polydipsia, polyphagia and polyuria.   Genitourinary: Positive for frequency.   Skin: Negative for color change and rash.   Neurological: Negative for dizziness, weakness, light-headedness and numbness.       Objective   BP 96/60   Pulse 78   Resp 16   Ht 157.5 cm (62\")   Wt 59 kg (130 lb)   LMP  (LMP Unknown)   SpO2 98%   BMI 23.78 kg/m²   Physical Exam   Constitutional: She is oriented to person, place, and time. She appears well-developed and well-nourished. She is cooperative.   HENT:   Head: Normocephalic and atraumatic.   Mouth/Throat: Oropharynx is clear and moist.   Eyes: Conjunctivae are normal. Pupils are equal, round, and reactive to light. No scleral icterus.   Neck: Neck supple. No JVD present. Carotid bruit is not present. No thyromegaly present.   Cardiovascular: Normal rate, regular rhythm, normal heart sounds and intact " distal pulses.   Pulmonary/Chest: Effort normal and breath sounds normal.   Abdominal: There is no hepatosplenomegaly.   Musculoskeletal: Normal range of motion.   Neurological: She is alert and oriented to person, place, and time.   No focal deficits no lateralizing signs   Skin: Skin is warm and dry. No rash noted.   Psychiatric: She has a normal mood and affect. Cognition and memory are normal.   Nursing note and vitals reviewed.      Assessment/Plan   Problem List Items Addressed This Visit        Active Problems    Controlled type 2 diabetes mellitus without complication, without long-term current use of insulin (CMS/[facility]) - Primary    Relevant Medications    TRESIBA FLEXTOUCH 100 UNIT/ML solution pen-injector injection    insulin aspart (NOVOLOG FLEXPEN) 100 UNIT/ML solution pen-injector sc pen        ER report reviewed  And has asked for endo follow up insurance did not cover humalog  New Medications Ordered This Visit   Medications   • insulin aspart (NOVOLOG FLEXPEN) 100 UNIT/ML solution pen-injector sc pen     Sig: Take 10 units before meals if glucose over 250     Dispense:  3 mL     Refill:  1   • lisinopril (PRINIVIL,ZESTRIL) 20 MG tablet     Sig: Take 0.5 tablets by mouth Daily.     Dispense:  90 tablet     Refill:  1       Check glucose 3 times a day and record and add novalog.

## 2019-08-30 ENCOUNTER — TELEPHONE (OUTPATIENT)
Dept: FAMILY MEDICINE CLINIC | Facility: CLINIC | Age: 64
End: 2019-08-30

## 2019-08-30 NOTE — TELEPHONE ENCOUNTER
----- Message from Ginger Villanueva Rep sent at 8/29/2019  7:41 AM EDT -----  Regarding: NEED REPLACEMENT PIECE  Contact: [phone]  DAMI[address] LOW[address]    PT HAS LOST THE TOP TO HER LANCET DEVICE AND WANTS TO KNOW IF SHE NEEDS A WHOLE NEW KIT TO GET THAT REPLACED? IF THAT IS THE CASE CAN YOU CALL ONE INTO THE PHARMACY?

## 2019-09-09 ENCOUNTER — OFFICE VISIT (OUTPATIENT)
Dept: FAMILY MEDICINE CLINIC | Facility: CLINIC | Age: 64
End: 2019-09-09

## 2019-09-09 VITALS
BODY MASS INDEX: 24.07 KG/M2 | RESPIRATION RATE: 16 BRPM | HEIGHT: 62 IN | DIASTOLIC BLOOD PRESSURE: 60 MMHG | HEART RATE: 75 BPM | WEIGHT: 130.8 LBS | OXYGEN SATURATION: 98 % | SYSTOLIC BLOOD PRESSURE: 100 MMHG

## 2019-09-09 DIAGNOSIS — I10 HYPERTENSION, UNSPECIFIED TYPE: ICD-10-CM

## 2019-09-09 DIAGNOSIS — E11.9 CONTROLLED TYPE 2 DIABETES MELLITUS WITHOUT COMPLICATION, WITHOUT LONG-TERM CURRENT USE OF INSULIN (HCC): Primary | ICD-10-CM

## 2019-09-09 PROCEDURE — 99213 OFFICE O/P EST LOW 20 MIN: CPT | Performed by: FAMILY MEDICINE

## 2019-09-09 RX ORDER — ESCITALOPRAM OXALATE 10 MG/1
10 TABLET ORAL DAILY
Qty: 90 TABLET | Refills: 1 | Status: SHIPPED | OUTPATIENT
Start: 2019-09-09 | End: 2020-04-02

## 2019-09-09 NOTE — PROGRESS NOTES
Subjective   Idania Mccray is a 63 y.o. female.     Diabetes   She presents for her follow-up (Evening blood sugar after supper is still in the 2-300 range and even higher sometimes in the morning blood sugars are normal) diabetic visit. She has type 2 diabetes mellitus. Her disease course has been improving. Hypoglycemia symptoms include hunger. Pertinent negatives for hypoglycemia include no dizziness or headaches. Associated symptoms include weakness. Pertinent negatives for diabetes include no chest pain and no fatigue. There are no hypoglycemic complications. Symptoms are improving. There are no diabetic complications. Risk factors for coronary artery disease include post-menopausal and sedentary lifestyle. Current diabetic treatment includes insulin injections and oral agent (monotherapy). Her weight is stable. She participates in exercise intermittently. An ACE inhibitor/angiotensin II receptor blocker is being taken.   Hypertension   This is a chronic (bp is stable) problem. The current episode started more than 1 month ago. The problem is unchanged. The problem is controlled. Pertinent negatives include no chest pain, headaches, palpitations or shortness of breath. Risk factors for coronary artery disease include post-menopausal state, diabetes mellitus and sedentary lifestyle. Current antihypertension treatment includes ACE inhibitors. The current treatment provides significant improvement. There are no compliance problems.         The following portions of the patient's history were reviewed and updated as appropriate: allergies, current medications, past social history and problem list.    Review of Systems   Constitutional: Negative for activity change and fatigue.   HENT: Negative for congestion and sore throat.    Eyes: Negative for pain and visual disturbance.   Respiratory: Negative for chest tightness and shortness of breath.    Cardiovascular: Negative for chest pain, palpitations and leg  "swelling.   Gastrointestinal: Negative for diarrhea, nausea and vomiting.   Genitourinary: Negative for dysuria and hematuria.   Neurological: Positive for weakness. Negative for dizziness, syncope and headaches.       Objective   /60   Pulse 75   Resp 16   Ht 157.5 cm (62\")   Wt 59.3 kg (130 lb 12.8 oz)   LMP  (LMP Unknown)   SpO2 98%   BMI 23.92 kg/m²   Physical Exam   Constitutional: She is oriented to person, place, and time. She appears well-developed and well-nourished. She is cooperative.   HENT:   Head: Normocephalic.   Right Ear: External ear normal.   Left Ear: External ear normal.   Nose: Nose normal.   Mouth/Throat: Oropharynx is clear and moist.   Eyes: Conjunctivae are normal. Pupils are equal, round, and reactive to light. No scleral icterus.   Neck: Neck supple. Carotid bruit is not present. No thyromegaly present.   Cardiovascular: Normal rate and regular rhythm.   Pulmonary/Chest: Effort normal and breath sounds normal.   Abdominal: There is no hepatosplenomegaly.   Musculoskeletal: Normal range of motion.   Neurological: She is alert and oriented to person, place, and time.   No focal deficits no lateralizing signs   Skin: Skin is warm and dry. No rash noted.   Psychiatric: She has a normal mood and affect. Cognition and memory are normal.   Nursing note and vitals reviewed.      Assessment/Plan   Problem List Items Addressed This Visit        Active Problems    Hypertension    Controlled type 2 diabetes mellitus without complication, without long-term current use of insulin (CMS/[facility]) - Primary          New Medications Ordered This Visit   Medications   • escitalopram (LEXAPRO) 10 MG tablet     Sig: Take 1 tablet by mouth Daily.     Dispense:  90 tablet     Refill:  1     Add 10 units of Humalog before evening meal continue to take half lisinopril daily.  Will up with me in 6 weeks as an appointment with Anika in 3 months.           "

## 2019-10-14 RX ORDER — ATORVASTATIN CALCIUM 10 MG/1
TABLET, FILM COATED ORAL
Qty: 90 TABLET | Refills: 0 | Status: SHIPPED | OUTPATIENT
Start: 2019-10-14 | End: 2020-02-09

## 2019-11-16 ENCOUNTER — OFFICE VISIT (OUTPATIENT)
Dept: FAMILY MEDICINE CLINIC | Facility: CLINIC | Age: 64
End: 2019-11-16

## 2019-11-16 VITALS
DIASTOLIC BLOOD PRESSURE: 54 MMHG | HEIGHT: 62 IN | RESPIRATION RATE: 18 BRPM | SYSTOLIC BLOOD PRESSURE: 92 MMHG | TEMPERATURE: 96.7 F | BODY MASS INDEX: 26.13 KG/M2 | OXYGEN SATURATION: 99 % | WEIGHT: 142 LBS | HEART RATE: 84 BPM

## 2019-11-16 DIAGNOSIS — J01.00 ACUTE NON-RECURRENT MAXILLARY SINUSITIS: Primary | ICD-10-CM

## 2019-11-16 DIAGNOSIS — R05.9 COUGH: ICD-10-CM

## 2019-11-16 PROCEDURE — 99213 OFFICE O/P EST LOW 20 MIN: CPT | Performed by: NURSE PRACTITIONER

## 2019-11-16 RX ORDER — BENZONATATE 100 MG/1
100 CAPSULE ORAL 3 TIMES DAILY PRN
Qty: 21 CAPSULE | Refills: 0 | Status: SHIPPED | OUTPATIENT
Start: 2019-11-16 | End: 2020-06-30

## 2019-11-16 RX ORDER — CEPHALEXIN 500 MG/1
500 CAPSULE ORAL 2 TIMES DAILY
Qty: 20 CAPSULE | Refills: 0 | Status: SHIPPED | OUTPATIENT
Start: 2019-11-16 | End: 2019-11-26

## 2019-11-16 RX ORDER — LANCING DEVICE/LANCETS
KIT MISCELLANEOUS
Refills: 0 | COMMUNITY
Start: 2019-08-29

## 2019-11-16 RX ORDER — FLUCONAZOLE 150 MG/1
150 TABLET ORAL DAILY
Qty: 2 TABLET | Refills: 0 | OUTPATIENT
Start: 2019-11-16 | End: 2019-11-29

## 2019-11-16 RX ORDER — DEXTROMETHORPHAN HYDROBROMIDE AND PROMETHAZINE HYDROCHLORIDE 15; 6.25 MG/5ML; MG/5ML
5 SYRUP ORAL NIGHTLY PRN
Qty: 118 ML | Refills: 0 | Status: SHIPPED | OUTPATIENT
Start: 2019-11-16 | End: 2020-06-30

## 2019-11-16 NOTE — PATIENT INSTRUCTIONS
Sinusitis, Adult  Sinusitis is inflammation of your sinuses. Sinuses are hollow spaces in the bones around your face. Your sinuses are located:  · Around your eyes.  · In the middle of your forehead.  · Behind your nose.  · In your cheekbones.  Mucus normally drains out of your sinuses. When your nasal tissues become inflamed or swollen, mucus can become trapped or blocked. This allows bacteria, viruses, and fungi to grow, which leads to infection. Most infections of the sinuses are caused by a virus.  Sinusitis can develop quickly. It can last for up to 4 weeks (acute) or for more than 12 weeks (chronic). Sinusitis often develops after a cold.  What are the causes?  This condition is caused by anything that creates swelling in the sinuses or stops mucus from draining. This includes:  · Allergies.  · Asthma.  · Infection from bacteria or viruses.  · Deformities or blockages in your nose or sinuses.  · Abnormal growths in the nose (nasal polyps).  · Pollutants, such as chemicals or irritants in the air.  · Infection from fungi (rare).  What increases the risk?  You are more likely to develop this condition if you:  · Have a weak body defense system (immune system).  · Do a lot of swimming or diving.  · Overuse nasal sprays.  · Smoke.  What are the signs or symptoms?  The main symptoms of this condition are pain and a feeling of pressure around the affected sinuses. Other symptoms include:  · Stuffy nose or congestion.  · Thick drainage from your nose.  · Swelling and warmth over the affected sinuses.  · Headache.  · Upper toothache.  · A cough that may get worse at night.  · Extra mucus that collects in the throat or the back of the nose (postnasal drip).  · Decreased sense of smell and taste.  · Fatigue.  · A fever.  · Sore throat.  · Bad breath.  How is this diagnosed?  This condition is diagnosed based on:  · Your symptoms.  · Your medical history.  · A physical exam.  · Tests to find out if your condition is  acute or chronic. This may include:  ? Checking your nose for nasal polyps.  ? Viewing your sinuses using a device that has a light (endoscope).  ? Testing for allergies or bacteria.  ? Imaging tests, such as an MRI or CT scan.  In rare cases, a bone biopsy may be done to rule out more serious types of fungal sinus disease.  How is this treated?  Treatment for sinusitis depends on the cause and whether your condition is chronic or acute.  · If caused by a virus, your symptoms should go away on their own within 10 days. You may be given medicines to relieve symptoms. They include:  ? Medicines that shrink swollen nasal passages (topical intranasal decongestants).  ? Medicines that treat allergies (antihistamines).  ? A spray that eases inflammation of the nostrils (topical intranasal corticosteroids).  ? Rinses that help get rid of thick mucus in your nose (nasal saline washes).  · If caused by bacteria, your health care provider may recommend waiting to see if your symptoms improve. Most bacterial infections will get better without antibiotic medicine. You may be given antibiotics if you have:  ? A severe infection.  ? A weak immune system.  · If caused by narrow nasal passages or nasal polyps, you may need to have surgery.  Follow these instructions at home:  Medicines  · Take, use, or apply over-the-counter and prescription medicines only as told by your health care provider. These may include nasal sprays.  · If you were prescribed an antibiotic medicine, take it as told by your health care provider. Do not stop taking the antibiotic even if you start to feel better.  Hydrate and humidify    · Drink enough fluid to keep your urine pale yellow. Staying hydrated will help to thin your mucus.  · Use a cool mist humidifier to keep the humidity level in your home above 50%.  · Inhale steam for 10-15 minutes, 3-4 times a day, or as told by your health care provider. You can do this in the bathroom while a hot shower is  running.  · Limit your exposure to cool or dry air.  Rest  · Rest as much as possible.  · Sleep with your head raised (elevated).  · Make sure you get enough sleep each night.  General instructions    · Apply a warm, moist washcloth to your face 3-4 times a day or as told by your health care provider. This will help with discomfort.  · Wash your hands often with soap and water to reduce your exposure to germs. If soap and water are not available, use hand .  · Do not smoke. Avoid being around people who are smoking (secondhand smoke).  · Keep all follow-up visits as told by your health care provider. This is important.  Contact a health care provider if:  · You have a fever.  · Your symptoms get worse.  · Your symptoms do not improve within 10 days.  Get help right away if:  · You have a severe headache.  · You have persistent vomiting.  · You have severe pain or swelling around your face or eyes.  · You have vision problems.  · You develop confusion.  · Your neck is stiff.  · You have trouble breathing.  Summary  · Sinusitis is soreness and inflammation of your sinuses. Sinuses are hollow spaces in the bones around your face.  · This condition is caused by nasal tissues that become inflamed or swollen. The swelling traps or blocks the flow of mucus. This allows bacteria, viruses, and fungi to grow, which leads to infection.  · If you were prescribed an antibiotic medicine, take it as told by your health care provider. Do not stop taking the antibiotic even if you start to feel better.  · Keep all follow-up visits as told by your health care provider. This is important.  This information is not intended to replace advice given to you by your health care provider. Make sure you discuss any questions you have with your health care provider.  Document Released: 12/18/2006 Document Revised: 05/20/2019 Document Reviewed: 05/20/2019  [employer] Interactive Patient Education © 2019 [employer]    Cough,  Adult    Coughing is a reflex that clears your throat and your airways. Coughing helps to heal and protect your lungs. It is normal to cough occasionally, but a cough that happens with other symptoms or lasts a long time may be a sign of a condition that needs treatment. A cough may last only 2-3 weeks (acute), or it may last longer than 8 weeks (chronic).  What are the causes?  Coughing is commonly caused by:  · Breathing in substances that irritate your lungs.  · A viral or bacterial respiratory infection.  · Allergies.  · Asthma.  · Postnasal drip.  · Smoking.  · Acid backing up from the stomach into the esophagus (gastroesophageal reflux).  · Certain medicines.  · Chronic lung problems, including COPD (or rarely, lung cancer).  · Other medical conditions such as heart failure.  Follow these instructions at home:  Pay attention to any changes in your symptoms. Take these actions to help with your discomfort:  · Take medicines only as told by your health care provider.  ? If you were prescribed an antibiotic medicine, take it as told by your health care provider. Do not stop taking the antibiotic even if you start to feel better.  ? Talk with your health care provider before you take a cough suppressant medicine.  · Drink enough fluid to keep your urine clear or pale yellow.  · If the air is dry, use a cold steam vaporizer or humidifier in your bedroom or your home to help loosen secretions.  · Avoid anything that causes you to cough at work or at home.  · If your cough is worse at night, try sleeping in a semi-upright position.  · Avoid cigarette smoke. If you smoke, quit smoking. If you need help quitting, ask your health care provider.  · Avoid caffeine.  · Avoid alcohol.  · Rest as needed.  Contact a health care provider if:  · You have new symptoms.  · You cough up pus.  · Your cough does not get better after 2-3 weeks, or your cough gets worse.  · You cannot control your cough with suppressant medicines and  you are losing sleep.  · You develop pain that is getting worse or pain that is not controlled with pain medicines.  · You have a fever.  · You have unexplained weight loss.  · You have night sweats.  Get help right away if:  · You cough up blood.  · You have difficulty breathing.  · Your heartbeat is very fast.  This information is not intended to replace advice given to you by your health care provider. Make sure you discuss any questions you have with your health care provider.  Document Released: 06/15/2012 Document Revised: 05/25/2017 Document Reviewed: 02/24/2016  [employer] Interactive Patient Education © 2019 [employer]  Cephalexin tablets or capsules  What is this medicine?  CEPHALEXIN (sef a EUGENE in) is a cephalosporin antibiotic. It is used to treat certain kinds of bacterial infections It will not work for colds, flu, or other viral infections.  This medicine may be used for other purposes; ask your health care provider or pharmacist if you have questions.  COMMON BRAND NAME(S): Biocef, Daxbia, Keflex, Keftab  What should I tell my health care provider before I take this medicine?  They need to know if you have any of these conditions:  -kidney disease  -stomach or intestine problems, especially colitis  -an unusual or allergic reaction to cephalexin, other cephalosporins, penicillins, other antibiotics, medicines, foods, dyes or preservatives  -pregnant or trying to get pregnant  -breast-feeding  How should I use this medicine?  Take this medicine by mouth with a full glass of water. Follow the directions on the prescription label. This medicine can be taken with or without food. Take your medicine at regular intervals. Do not take your medicine more often than directed. Take all of your medicine as directed even if you think you are better. Do not skip doses or stop your medicine early.  Talk to your pediatrician regarding the use of this medicine in children. While this drug may be prescribed for  selected conditions, precautions do apply.  Overdosage: If you think you have taken too much of this medicine contact a poison control center or emergency room at once.  NOTE: This medicine is only for you. Do not share this medicine with others.  What if I miss a dose?  If you miss a dose, take it as soon as you can. If it is almost time for your next dose, take only that dose. Do not take double or extra doses. There should be at least 4 to 6 hours between doses.  What may interact with this medicine?  -probenecid  -some other antibiotics  This list may not describe all possible interactions. Give your health care provider a list of all the medicines, herbs, non-prescription drugs, or dietary supplements you use. Also tell them if you smoke, drink alcohol, or use illegal drugs. Some items may interact with your medicine.  What should I watch for while using this medicine?  Tell your doctor or health care professional if your symptoms do not begin to improve in a few days.  Do not treat diarrhea with over the counter products. Contact your doctor if you have diarrhea that lasts more than 2 days or if it is severe and watery.  If you have diabetes, you may get a false-positive result for sugar in your urine. Check with your doctor or health care professional.  What side effects may I notice from receiving this medicine?  Side effects that you should report to your doctor or health care professional as soon as possible:  -allergic reactions like skin rash, itching or hives, swelling of the face, lips, or tongue  -breathing problems  -pain or trouble passing urine  -redness, blistering, peeling or loosening of the skin, including inside the mouth  -severe or watery diarrhea  -unusually weak or tired  -yellowing of the eyes, skin  Side effects that usually do not require medical attention (report to your doctor or health care professional if they continue or are bothersome):  -gas or heartburn  -genital or anal  irritation  -headache  -joint or muscle pain  -nausea, vomiting  This list may not describe all possible side effects. Call your doctor for medical advice about side effects. You may report side effects to FDA at 7-003-ZPN-9989.  Where should I keep my medicine?  Keep out of the reach of children.  Store at room temperature between 59 and 86 degrees F (15 and 30 degrees C). Throw away any unused medicine after the expiration date.  NOTE: This sheet is a summary. It may not cover all possible information. If you have questions about this medicine, talk to your doctor, pharmacist, or health care provider.  © 2019 [employer] (2009-03-23 17:09:13)  Fluconazole tablets  What is this medicine?  FLUCONAZOLE (floo MARLA na zole) is an antifungal medicine. It is used to treat certain kinds of fungal or yeast infections.  This medicine may be used for other purposes; ask your health care provider or pharmacist if you have questions.  COMMON BRAND NAME(S): Diflucan  What should I tell my health care provider before I take this medicine?  They need to know if you have any of these conditions:  -history of irregular heart beat  -kidney disease  -an unusual or allergic reaction to fluconazole, other azole antifungals, medicines, foods, dyes, or preservatives  -pregnant or trying to get pregnant  -breast-feeding  How should I use this medicine?  Take this medicine by mouth. Follow the directions on the prescription label. Do not take your medicine more often than directed.  Talk to your pediatrician regarding the use of this medicine in children. Special care may be needed. This medicine has been used in children as young as 6 months of age.  Overdosage: If you think you have taken too much of this medicine contact a poison control center or emergency room at once.  NOTE: This medicine is only for you. Do not share this medicine with others.  What if I miss a dose?  If you miss a dose, take it as soon as you can. If it  is almost time for your next dose, take only that dose. Do not take double or extra doses.  What may interact with this medicine?  Do not take this medicine with any of the following medications:  -astemizole  -certain medicines for irregular heart beat like dofetilide, dronedarone, quinidine  -cisapride  -erythromycin  -lomitapide  -other medicines that prolong the QT interval (cause an abnormal heart rhythm)  -pimozide  -terfenadine  -thioridazine  -tolvaptan  -ziprasidone  This medicine may also interact with the following medications:  -antiviral medicines for HIV or AIDS  -birth control pills  -certain antibiotics like rifabutin, rifampin  -certain medicines for blood pressure like amlodipine, isradipine, felodipine, hydrochlorothiazide, losartan, nifedipine  -certain medicines for cancer like cyclophosphamide, vinblastine, vincristine  -certain medicines for cholesterol like atorvastatin, lovastatin, fluvastatin, simvastatin  -certain medicines for depression, anxiety, or psychotic disturbances like amitriptyline, midazolam, nortriptyline, triazolam  -certain medicines for diabetes like glipizide, glyburide, tolbutamide  -certain medicines for pain like alfentanil, fentanyl, methadone  -certain medicines for seizures like carbamazepine, phenytoin  -certain medicines that treat or prevent blood clots like warfarin  -halofantrine  -medicines that lower your chance of fighting infection like cyclosporine, prednisone, tacrolimus  -NSAIDS, medicines for pain and inflammation, like celecoxib, diclofenac, flurbiprofen, ibuprofen, meloxicam, naproxen  -other medicines for fungal infections  -sirolimus  -theophylline  -tofacitinib  This list may not describe all possible interactions. Give your health care provider a list of all the medicines, herbs, non-prescription drugs, or dietary supplements you use. Also tell them if you smoke, drink alcohol, or use illegal drugs. Some items may interact with your medicine.  What  should I watch for while using this medicine?  Visit your doctor or health care professional for regular checkups. If you are taking this medicine for a long time you may need blood work. Tell your doctor if your symptoms do not improve. Some fungal infections need many weeks or months of treatment to cure.  Alcohol can increase possible damage to your liver. Avoid alcoholic drinks.  If you have a vaginal infection, do not have sex until you have finished your treatment. You can wear a sanitary napkin. Do not use tampons. Wear freshly washed cotton, not synthetic, panties.  What side effects may I notice from receiving this medicine?  Side effects that you should report to your doctor or health care professional as soon as possible:  -allergic reactions like skin rash or itching, hives, swelling of the lips, mouth, tongue, or throat  -dark urine  -feeling dizzy or faint  -irregular heartbeat or chest pain  -redness, blistering, peeling or loosening of the skin, including inside the mouth  -trouble breathing  -unusual bruising or bleeding  -vomiting  -yellowing of the eyes or skin  Side effects that usually do not require medical attention (report to your doctor or health care professional if they continue or are bothersome):  -changes in how food tastes  -diarrhea  -headache  -stomach upset or nausea  This list may not describe all possible side effects. Call your doctor for medical advice about side effects. You may report side effects to FDA at 3-473-FDA-5640.  Where should I keep my medicine?  Keep out of the reach of children.  Store at room temperature below 30 degrees C (86 degrees F). Throw away any medicine after the expiration date.  NOTE: This sheet is a summary. It may not cover all possible information. If you have questions about this medicine, talk to your doctor, pharmacist, or health care provider.  © 2019 [employer] (2014-07-26 19:37:38)  Benzonatate capsules  What is this  medicine?  BENZONATATE (kym KARLEE na le) is used to treat cough.  This medicine may be used for other purposes; ask your health care provider or pharmacist if you have questions.  COMMON BRAND NAME(S): Marv Mendez  What should I tell my health care provider before I take this medicine?  They need to know if you have any of these conditions:  -kidney or liver disease  -an unusual or allergic reaction to benzonatate, anesthetics, other medicines, foods, dyes, or preservatives  -pregnant or trying to get pregnant  -breast-feeding  How should I use this medicine?  Take this medicine by mouth with a glass of water. Follow the directions on the prescription label. Avoid breaking, chewing, or sucking the capsule, as this can cause serious side effects. Take your medicine at regular intervals. Do not take your medicine more often than directed.  Talk to your pediatrician regarding the use of this medicine in children. While this drug may be prescribed for children as young as 10 years old for selected conditions, precautions do apply.  Overdosage: If you think you have taken too much of this medicine contact a poison control center or emergency room at once.  NOTE: This medicine is only for you. Do not share this medicine with others.  What if I miss a dose?  If you miss a dose, take it as soon as you can. If it is almost time for your next dose, take only that dose. Do not take double or extra doses.  What may interact with this medicine?  Do not take this medicine with any of the following medications:  -MAOIs like Carbex, Eldepryl, Marplan, Nardil, and Parnate  This list may not describe all possible interactions. Give your health care provider a list of all the medicines, herbs, non-prescription drugs, or dietary supplements you use. Also tell them if you smoke, drink alcohol, or use illegal drugs. Some items may interact with your medicine.  What should I watch for while using this medicine?  Tell your  doctor if your symptoms do not improve or if they get worse. If you have a high fever, skin rash, or headache, see your health care professional.  You may get drowsy or dizzy. Do not drive, use machinery, or do anything that needs mental alertness until you know how this medicine affects you. Do not sit or stand up quickly, especially if you are an older patient. This reduces the risk of dizzy or fainting spells.  What side effects may I notice from receiving this medicine?  Side effects that you should report to your doctor or health care professional as soon as possible:  -allergic reactions like skin rash, itching or hives, swelling of the face, lips, or tongue  -breathing problems  -chest pain  -confusion or hallucinations  -irregular heartbeat  -numbness of mouth or throat  -seizures  Side effects that usually do not require medical attention (report to your doctor or health care professional if they continue or are bothersome):  -burning feeling in the eyes  -constipation  -headache  -nasal congestion  -stomach upset  This list may not describe all possible side effects. Call your doctor for medical advice about side effects. You may report side effects to FDA at 9-933-FDA-1197.  Where should I keep my medicine?  Keep out of the reach of children.  Store at room temperature between 15 and 30 degrees C (59 and 86 degrees F). Keep tightly closed. Protect from light and moisture. Throw away any unused medicine after the expiration date.  NOTE: This sheet is a summary. It may not cover all possible information. If you have questions about this medicine, talk to your doctor, pharmacist, or health care provider.  © 2019 [employer] (2009-03-18 14:52:56)

## 2019-11-16 NOTE — PROGRESS NOTES
Subjective   Idania Mccray is a 64 y.o. female.     URI    This is a new problem. The current episode started 1 to 4 weeks ago. The problem has been gradually worsening. There has been no fever. Associated symptoms include congestion, coughing (frequent, non-productive, interfering with sleep), headaches, a plugged ear sensation, sinus pain and a sore throat. Pertinent negatives include no abdominal pain, chest pain, diarrhea, ear pain, nausea, neck pain, rash, vomiting or wheezing. Treatments tried: Xyzal. The treatment provided no relief.   Cough   This is a new problem. The current episode started in the past 7 days. The problem has been gradually worsening. The problem occurs every few minutes. The cough is non-productive. Associated symptoms include ear congestion, headaches, nasal congestion, postnasal drip and a sore throat. Pertinent negatives include no chest pain, chills, ear pain, fever, hemoptysis, myalgias, rash, shortness of breath or wheezing. The symptoms are aggravated by lying down. She has tried rest for the symptoms. Her past medical history is significant for environmental allergies.       The following portions of the patient's history were reviewed and updated as appropriate: allergies, current medications, past family history, past medical history, past social history, past surgical history and problem list.    Allergies as of 11/16/2019 - Reviewed 11/16/2019   Allergen Reaction Noted   • Augmentin [amoxicillin-pot clavulanate] Diarrhea 08/26/2016       Current Outpatient Medications on File Prior to Visit   Medication Sig   • ALPRAZolam (XANAX) 0.25 MG tablet Take 0.25 mg by mouth As Needed.   • aspirin 81 MG EC tablet Take 81 mg by mouth Daily.   • atorvastatin (LIPITOR) 10 MG tablet TAKE 1 TABLET BY MOUTH EVERY DAY   • BD PEN NEEDLE GARRETT U/F 32G X 4 MM misc Daily.   • Blood Glucose Monitoring Suppl (ONE TOUCH ULTRA MINI) w/Device kit USE AS DIRECTED [E11.9]   • ciprofloxacin (CILOXAN)  0.3 % ophthalmic solution Administer 2 drops to both eyes Every 4 (Four) Hours.   • escitalopram (LEXAPRO) 10 MG tablet Take 1 tablet by mouth Daily.   • insulin aspart (NOVOLOG FLEXPEN) 100 UNIT/ML solution pen-injector sc pen Take 10 units before meals if glucose over 250   • Insulin Degludec (TRESIBA SC) Inject 20 Units under the skin into the appropriate area as directed Every Night.   • Lancet Devices (ONE TOUCH DELICA LANCING DEV) misc USE AS DIRECTED TO TEST BG DAILY DX CODE: E11.65   • levothyroxine (SYNTHROID, LEVOTHROID) 75 MCG tablet Take 75 mcg by mouth Daily.   • lisinopril (PRINIVIL,ZESTRIL) 20 MG tablet Take 0.5 tablets by mouth Daily.   • metFORMIN ER (GLUCOPHAGE-XR) 500 MG 24 hr tablet Take 1,000 mg by mouth 2 (Two) Times a Day.   • ONE TOUCH ULTRA TEST test strip 1 each by Other route 2 (Two) Times a Day. Use as instructed   • ONETOUCH DELICA LANCETS FINE misc USE TO TEST TWICE A DAY [E11.9]   • TRESIBA FLEXTOUCH 100 UNIT/ML solution pen-injector injection Inject 23 Units under the skin into the appropriate area as directed Every Night.   • TRULICITY 1.5 MG/0.5ML solution pen-injector INJECT AS DIRECTED ONCE A WEEK   • hepatitis A (HAVRIX) 1440 EL U/ML vaccine Inject 1 mL into the appropriate muscle as directed by prescriber. Repeat in 6 months   • ondansetron ODT (ZOFRAN-ODT) 4 MG disintegrating tablet Take 1 tablet by mouth Every 8 (Eight) Hours As Needed for Nausea or Vomiting.     No current facility-administered medications on file prior to visit.        Review of Systems   Constitutional: Positive for fatigue. Negative for appetite change, chills, diaphoresis and fever.   HENT: Positive for congestion, postnasal drip, sinus pressure, sinus pain and sore throat. Negative for ear discharge, ear pain and trouble swallowing.    Respiratory: Positive for cough (frequent, non-productive, interfering with sleep). Negative for hemoptysis, chest tightness, shortness of breath, wheezing and stridor.     Cardiovascular: Negative.  Negative for chest pain.   Gastrointestinal: Negative for abdominal pain, diarrhea, nausea and vomiting.   Musculoskeletal: Negative for arthralgias, myalgias and neck pain.   Skin: Negative for rash.   Allergic/Immunologic: Positive for environmental allergies.   Neurological: Positive for headaches. Negative for dizziness.       Objective   Physical Exam   Constitutional: She is oriented to person, place, and time. Vital signs are normal. She appears well-developed and well-nourished. She is cooperative. No distress.   HENT:   Head: Normocephalic and atraumatic.   Right Ear: External ear normal. Tympanic membrane is injected and bulging. A middle ear effusion is present.   Left Ear: External ear normal. Tympanic membrane is injected and bulging. A middle ear effusion is present.   Nose: Mucosal edema present. Right sinus exhibits maxillary sinus tenderness. Left sinus exhibits maxillary sinus tenderness.   Mouth/Throat: Uvula is midline and mucous membranes are normal. Posterior oropharyngeal erythema (moderate with cobblestoning) present.   Neck: Normal range of motion. Neck supple. No thyromegaly present.   Cardiovascular: Normal rate, regular rhythm and normal heart sounds.   Pulmonary/Chest: Effort normal and breath sounds normal. No respiratory distress. She has no wheezes. She has no rhonchi.   Abdominal: Soft. She exhibits no distension. There is no tenderness.   Lymphadenopathy:     She has no cervical adenopathy.   Neurological: She is alert and oriented to person, place, and time.   Skin: Skin is warm, dry and intact. No rash noted. She is not diaphoretic.   Psychiatric: She has a normal mood and affect. Her behavior is normal. Judgment and thought content normal.   Vitals reviewed.    Vitals:    11/16/19 1044   BP: 92/54   Pulse: 84   Resp: 18   Temp: 96.7 °F (35.9 °C)   SpO2: 99%     Body mass index is 25.97 kg/m².    Assessment/Plan   Idania was seen today for  cough.    Diagnoses and all orders for this visit:    Acute non-recurrent maxillary sinusitis  -     cephalexin (KEFLEX) 500 MG capsule; Take 1 capsule by mouth 2 (Two) Times a Day for 10 days.  -     benzonatate (TESSALON PERLES) 100 MG capsule; Take 1 capsule by mouth 3 (Three) Times a Day As Needed for Cough.  -     fluconazole (DIFLUCAN) 150 MG tablet; Take 1 tablet by mouth Daily.    Cough  -     promethazine-dextromethorphan (PROMETHAZINE-DM) 6.25-15 MG/5ML syrup; Take 5 mL by mouth At Night As Needed for Cough.       Discussed the nature of the medical condition(s) risks, complications, implications, management, safe and proper use of medications. Encouraged medication compliance, and keeping scheduled follow up appointments with me and any other providers.

## 2019-11-29 PROBLEM — J06.9 VIRAL URI: Status: ACTIVE | Noted: 2019-11-29

## 2019-12-09 ENCOUNTER — OFFICE VISIT (OUTPATIENT)
Dept: FAMILY MEDICINE CLINIC | Facility: CLINIC | Age: 64
End: 2019-12-09

## 2019-12-09 VITALS
HEART RATE: 71 BPM | SYSTOLIC BLOOD PRESSURE: 110 MMHG | TEMPERATURE: 97.7 F | DIASTOLIC BLOOD PRESSURE: 64 MMHG | RESPIRATION RATE: 18 BRPM | HEIGHT: 62 IN | OXYGEN SATURATION: 99 % | WEIGHT: 140.2 LBS | BODY MASS INDEX: 25.8 KG/M2

## 2019-12-09 DIAGNOSIS — J01.00 ACUTE NON-RECURRENT MAXILLARY SINUSITIS: Primary | ICD-10-CM

## 2019-12-09 DIAGNOSIS — J40 BRONCHITIS: ICD-10-CM

## 2019-12-09 PROCEDURE — 99213 OFFICE O/P EST LOW 20 MIN: CPT | Performed by: NURSE PRACTITIONER

## 2019-12-09 RX ORDER — DOXYCYCLINE HYCLATE 100 MG/1
100 CAPSULE ORAL 2 TIMES DAILY
Qty: 14 CAPSULE | Refills: 0 | Status: SHIPPED | OUTPATIENT
Start: 2019-12-09 | End: 2019-12-16

## 2019-12-09 NOTE — PATIENT INSTRUCTIONS
Sinusitis, Adult  Sinusitis is inflammation of your sinuses. Sinuses are hollow spaces in the bones around your face. Your sinuses are located:  · Around your eyes.  · In the middle of your forehead.  · Behind your nose.  · In your cheekbones.  Mucus normally drains out of your sinuses. When your nasal tissues become inflamed or swollen, mucus can become trapped or blocked. This allows bacteria, viruses, and fungi to grow, which leads to infection. Most infections of the sinuses are caused by a virus.  Sinusitis can develop quickly. It can last for up to 4 weeks (acute) or for more than 12 weeks (chronic). Sinusitis often develops after a cold.  What are the causes?  This condition is caused by anything that creates swelling in the sinuses or stops mucus from draining. This includes:  · Allergies.  · Asthma.  · Infection from bacteria or viruses.  · Deformities or blockages in your nose or sinuses.  · Abnormal growths in the nose (nasal polyps).  · Pollutants, such as chemicals or irritants in the air.  · Infection from fungi (rare).  What increases the risk?  You are more likely to develop this condition if you:  · Have a weak body defense system (immune system).  · Do a lot of swimming or diving.  · Overuse nasal sprays.  · Smoke.  What are the signs or symptoms?  The main symptoms of this condition are pain and a feeling of pressure around the affected sinuses. Other symptoms include:  · Stuffy nose or congestion.  · Thick drainage from your nose.  · Swelling and warmth over the affected sinuses.  · Headache.  · Upper toothache.  · A cough that may get worse at night.  · Extra mucus that collects in the throat or the back of the nose (postnasal drip).  · Decreased sense of smell and taste.  · Fatigue.  · A fever.  · Sore throat.  · Bad breath.  How is this diagnosed?  This condition is diagnosed based on:  · Your symptoms.  · Your medical history.  · A physical exam.  · Tests to find out if your condition is  acute or chronic. This may include:  ? Checking your nose for nasal polyps.  ? Viewing your sinuses using a device that has a light (endoscope).  ? Testing for allergies or bacteria.  ? Imaging tests, such as an MRI or CT scan.  In rare cases, a bone biopsy may be done to rule out more serious types of fungal sinus disease.  How is this treated?  Treatment for sinusitis depends on the cause and whether your condition is chronic or acute.  · If caused by a virus, your symptoms should go away on their own within 10 days. You may be given medicines to relieve symptoms. They include:  ? Medicines that shrink swollen nasal passages (topical intranasal decongestants).  ? Medicines that treat allergies (antihistamines).  ? A spray that eases inflammation of the nostrils (topical intranasal corticosteroids).  ? Rinses that help get rid of thick mucus in your nose (nasal saline washes).  · If caused by bacteria, your health care provider may recommend waiting to see if your symptoms improve. Most bacterial infections will get better without antibiotic medicine. You may be given antibiotics if you have:  ? A severe infection.  ? A weak immune system.  · If caused by narrow nasal passages or nasal polyps, you may need to have surgery.  Follow these instructions at home:  Medicines  · Take, use, or apply over-the-counter and prescription medicines only as told by your health care provider. These may include nasal sprays.  · If you were prescribed an antibiotic medicine, take it as told by your health care provider. Do not stop taking the antibiotic even if you start to feel better.  Hydrate and humidify    · Drink enough fluid to keep your urine pale yellow. Staying hydrated will help to thin your mucus.  · Use a cool mist humidifier to keep the humidity level in your home above 50%.  · Inhale steam for 10-15 minutes, 3-4 times a day, or as told by your health care provider. You can do this in the bathroom while a hot shower is  running.  · Limit your exposure to cool or dry air.  Rest  · Rest as much as possible.  · Sleep with your head raised (elevated).  · Make sure you get enough sleep each night.  General instructions    · Apply a warm, moist washcloth to your face 3-4 times a day or as told by your health care provider. This will help with discomfort.  · Wash your hands often with soap and water to reduce your exposure to germs. If soap and water are not available, use hand .  · Do not smoke. Avoid being around people who are smoking (secondhand smoke).  · Keep all follow-up visits as told by your health care provider. This is important.  Contact a health care provider if:  · You have a fever.  · Your symptoms get worse.  · Your symptoms do not improve within 10 days.  Get help right away if:  · You have a severe headache.  · You have persistent vomiting.  · You have severe pain or swelling around your face or eyes.  · You have vision problems.  · You develop confusion.  · Your neck is stiff.  · You have trouble breathing.  Summary  · Sinusitis is soreness and inflammation of your sinuses. Sinuses are hollow spaces in the bones around your face.  · This condition is caused by nasal tissues that become inflamed or swollen. The swelling traps or blocks the flow of mucus. This allows bacteria, viruses, and fungi to grow, which leads to infection.  · If you were prescribed an antibiotic medicine, take it as told by your health care provider. Do not stop taking the antibiotic even if you start to feel better.  · Keep all follow-up visits as told by your health care provider. This is important.  This information is not intended to replace advice given to you by your health care provider. Make sure you discuss any questions you have with your health care provider.  Document Released: 12/18/2006 Document Revised: 05/20/2019 Document Reviewed: 05/20/2019  [employer] Interactive Patient Education © 2019 [employer]    Acute Bronchitis,  Adult    Acute bronchitis is sudden (acute) swelling of the air tubes (bronchi) in the lungs. Acute bronchitis causes these tubes to fill with mucus, which can make it hard to breathe. It can also cause coughing or wheezing.  In adults, acute bronchitis usually goes away within 2 weeks. A cough caused by bronchitis may last up to 3 weeks. Smoking, allergies, and asthma can make the condition worse. Repeated episodes of bronchitis may cause further lung problems, such as chronic obstructive pulmonary disease (COPD).  What are the causes?  This condition can be caused by germs and by substances that irritate the lungs, including:  · Cold and flu viruses. This condition is most often caused by the same virus that causes a cold.  · Bacteria.  · Exposure to tobacco smoke, dust, fumes, and air pollution.  What increases the risk?  This condition is more likely to develop in people who:  · Have close contact with someone with acute bronchitis.  · Are exposed to lung irritants, such as tobacco smoke, dust, fumes, and vapors.  · Have a weak immune system.  · Have a respiratory condition such as asthma.  What are the signs or symptoms?  Symptoms of this condition include:  · A cough.  · Coughing up clear, yellow, or green mucus.  · Wheezing.  · Chest congestion.  · Shortness of breath.  · A fever.  · Body aches.  · Chills.  · A sore throat.  How is this diagnosed?  This condition is usually diagnosed with a physical exam. During the exam, your health care provider may order tests, such as chest X-rays, to rule out other conditions. He or she may also:  · Test a sample of your mucus for bacterial infection.  · Check the level of oxygen in your blood. This is done to check for pneumonia.  · Do a chest X-ray or lung function testing to rule out pneumonia and other conditions.  · Perform blood tests.  Your health care provider will also ask about your symptoms and medical history.  How is this treated?  Most cases of acute  bronchitis clear up over time without treatment. Your health care provider may recommend:  · Drinking more fluids. Drinking more makes your mucus thinner, which may make it easier to breathe.  · Taking a medicine for a fever or cough.  · Taking an antibiotic medicine.  · Using an inhaler to help improve shortness of breath and to control a cough.  · Using a cool mist vaporizer or humidifier to make it easier to breathe.  Follow these instructions at home:  Medicines  · Take over-the-counter and prescription medicines only as told by your health care provider.  · If you were prescribed an antibiotic, take it as told by your health care provider. Do not stop taking the antibiotic even if you start to feel better.  General instructions    · Get plenty of rest.  · Drink enough fluids to keep your urine pale yellow.  · Avoid smoking and secondhand smoke. Exposure to cigarette smoke or irritating chemicals will make bronchitis worse. If you smoke and you need help quitting, ask your health care provider. Quitting smoking will help your lungs heal faster.  · Use an inhaler, cool mist vaporizer, or humidifier as told by your health care provider.  · Keep all follow-up visits as told by your health care provider. This is important.  How is this prevented?  To lower your risk of getting this condition again:  · Wash your hands often with soap and water. If soap and water are not available, use hand .  · Avoid contact with people who have cold symptoms.  · Try not to touch your hands to your mouth, nose, or eyes.  · Make sure to get the flu shot every year.  Contact a health care provider if:  · Your symptoms do not improve in 2 weeks of treatment.  Get help right away if:  · You cough up blood.  · You have chest pain.  · You have severe shortness of breath.  · You become dehydrated.  · You faint or keep feeling like you are going to faint.  · You keep vomiting.  · You have a severe headache.  · Your fever or chills  gets worse.  This information is not intended to replace advice given to you by your health care provider. Make sure you discuss any questions you have with your health care provider.  Document Released: 01/25/2006 Document Revised: 08/01/2018 Document Reviewed: 06/07/2017  [employer] Interactive Patient Education © 2019 [employer]  Doxycycline tablets or capsules  What is this medicine?  DOXYCYCLINE (dox i SAMANTA garcia) is a tetracycline antibiotic. It kills certain bacteria or stops their growth. It is used to treat many kinds of infections, like dental, skin, respiratory, and urinary tract infections. It also treats acne, Lyme disease, malaria, and certain sexually transmitted infections.  This medicine may be used for other purposes; ask your health care provider or pharmacist if you have questions.  COMMON BRAND NAME(S): Acticlate, Adoxa, Adoxa CK, Adoxa Shane, Adoxa TT, Alodox, Avidoxy, Doxal, LYMEPAK, Mondoxyne NL, Monodox, Morgidox 1x, Morgidox 1x Kit, Morgidox 2x, Morgidox 2x Kit, NutriDox, Ocudox, TARGADOX, Vibra-Tabs, Vibramycin  What should I tell my health care provider before I take this medicine?  They need to know if you have any of these conditions:  -liver disease  -long exposure to sunlight like working outdoors  -stomach problems like colitis  -an unusual or allergic reaction to doxycycline, tetracycline antibiotics, other medicines, foods, dyes, or preservatives  -pregnant or trying to get pregnant  -breast-feeding  How should I use this medicine?  Take this medicine by mouth with a full glass of water. Follow the directions on the prescription label. It is best to take this medicine without food, but if it upsets your stomach take it with food. Take your medicine at regular intervals. Do not take your medicine more often than directed. Take all of your medicine as directed even if you think you are better. Do not skip doses or stop your medicine early.  Talk to your pediatrician regarding the use  of this medicine in children. While this drug may be prescribed for selected conditions, precautions do apply.  Overdosage: If you think you have taken too much of this medicine contact a poison control center or emergency room at once.  NOTE: This medicine is only for you. Do not share this medicine with others.  What if I miss a dose?  If you miss a dose, take it as soon as you can. If it is almost time for your next dose, take only that dose. Do not take double or extra doses.  What may interact with this medicine?  -antacids  -barbiturates  -birth control pills  -bismuth subsalicylate  -carbamazepine  -methoxyflurane  -other antibiotics  -phenytoin  -vitamins that contain iron  -warfarin  This list may not describe all possible interactions. Give your health care provider a list of all the medicines, herbs, non-prescription drugs, or dietary supplements you use. Also tell them if you smoke, drink alcohol, or use illegal drugs. Some items may interact with your medicine.  What should I watch for while using this medicine?  Tell your doctor or health care professional if your symptoms do not improve.  Do not treat diarrhea with over the counter products. Contact your doctor if you have diarrhea that lasts more than 2 days or if it is severe and watery.  Do not take this medicine just before going to bed. It may not dissolve properly when you lay down and can cause pain in your throat. Drink plenty of fluids while taking this medicine to also help reduce irritation in your throat.  This medicine can make you more sensitive to the sun. Keep out of the sun. If you cannot avoid being in the sun, wear protective clothing and use sunscreen. Do not use sun lamps or tanning beds/booths.  Birth control pills may not work properly while you are taking this medicine. Talk to your doctor about using an extra method of birth control.  If you are being treated for a sexually transmitted infection, avoid sexual contact until you  have finished your treatment. Your sexual partner may also need treatment.  Avoid antacids, aluminum, calcium, magnesium, and iron products for 4 hours before and 2 hours after taking a dose of this medicine.  If you are using this medicine to prevent malaria, you should still protect yourself from contact with mosquitos. Stay in screened-in areas, use mosquito nets, keep your body covered, and use an insect repellent.  What side effects may I notice from receiving this medicine?  Side effects that you should report to your doctor or health care professional as soon as possible:  -allergic reactions like skin rash, itching or hives, swelling of the face, lips, or tongue  -difficulty breathing  -fever  -itching in the rectal or genital area  -pain on swallowing  -redness, blistering, peeling or loosening of the skin, including inside the mouth  -severe stomach pain or cramps  -unusual bleeding or bruising  -unusually weak or tired  -yellowing of the eyes or skin  Side effects that usually do not require medical attention (report to your doctor or health care professional if they continue or are bothersome):  -diarrhea  -loss of appetite  -nausea, vomiting  This list may not describe all possible side effects. Call your doctor for medical advice about side effects. You may report side effects to FDA at 3-372-FDA-7631.  Where should I keep my medicine?  Keep out of the reach of children.  Store at room temperature, below 30 degrees C (86 degrees F). Protect from light. Keep container tightly closed. Throw away any unused medicine after the expiration date. Taking this medicine after the expiration date can make you seriously ill.  NOTE: This sheet is a summary. It may not cover all possible information. If you have questions about this medicine, talk to your doctor, pharmacist, or health care provider.  © 2019 [employer] (2017-01-18 17:11:22)  Beclomethasone respiratory inhalation aerosol  What is this  medicine?  BECLOMETHASONE (be kloe METH a sone) is a corticosteroid. It helps decrease inflammation in your lungs. This medicine is used to treat the symptoms of asthma. Never use this medicine for an acute asthma attack.  This medicine may be used for other purposes; ask your health care provider or pharmacist if you have questions.  COMMON BRAND NAME(S): Beclovent, QVAR, Vancenase, Vancenase AQ, Vanceril  What should I tell my health care provider before I take this medicine?  They need to know if you have any of these conditions:  -bone problems  -eye disease, vision problems  -glaucoma  -immune system problems  -infection, like chickenpox, tuberculosis, herpes, or fungal infection  -recent surgery or injury of mouth or throat  -taking corticosteroids by mouth  -an unusual or allergic reaction to beclomethasone, other corticosteroids, other medicines, foods, dyes, or preservatives  -pregnant or trying to get pregnant  -breast-feeding  How should I use this medicine?  This medicine is for inhalation through the mouth. Rinse your mouth with water after use. Make sure not to swallow the water. Follow the directions on your prescription label. This medicine works best if used regularly. Do not use more than directed. Do not stop taking except on your doctor's advice. Make sure that you are using your inhaler correctly. Ask you doctor or health care provider if you have any questions.  Talk to your pediatrician regarding the use of this medicine in children. While this drug may be prescribed for children as young as 5 years old for selected conditions, precautions do apply.  Overdosage: If you think you have taken too much of this medicine contact a poison control center or emergency room at once.  NOTE: This medicine is only for you. Do not share this medicine with others.  What if I miss a dose?  If you miss a dose, use it as soon as you remember. If it is almost time for your next dose, use only that dose and  continue with your regular schedule, spacing doses evenly. Do not use double or extra doses.  What may interact with this medicine?  Interactions are not expected.  This list may not describe all possible interactions. Give your health care provider a list of all the medicines, herbs, non-prescription drugs, or dietary supplements you use. Also tell them if you smoke, drink alcohol, or use illegal drugs. Some items may interact with your medicine.  What should I watch for while using this medicine?  Visit your doctor or health care professional for regular check ups. Use this medicine regularly. Tell your doctor if your symptoms do not improve. Do not use extra medicine. If your asthma symptoms get worse while you are using this medicine, call your doctor right away.  Do not come in contact with people who have chickenpox or the measles while you are taking this medicine. If you do, call your doctor right away.  What side effects may I notice from receiving this medicine?  Side effects that you should report to your doctor or health care professional as soon as possible:  -allergic reactions like skin rash, itching or hives, swelling of the face, lips, or tongue  -changes in vision  -chest pain, tightness  -fever, infection  -trouble breathing, wheezing  -unusual swelling  -white patches or sores in the mouth or throat  Side effects that usually do not require medical attention (report to your doctor or health care professional if they continue or are bothersome):  -burning, irritation in throat  -cough  -dry mouth  -headache  -unusual taste or smell  This list may not describe all possible side effects. Call your doctor for medical advice about side effects. You may report side effects to FDA at 1-382-FDA-8663.  Where should I keep my medicine?  Keep out of the reach of children.  Store at room temperature between 15 and 30 degrees C (59 and 86 degrees F). Do not puncture the canister or throw on a fire or  incinerator. Throw away any unused medicine after the expiration date.  NOTE: This sheet is a summary. It may not cover all possible information. If you have questions about this medicine, talk to your doctor, pharmacist, or health care provider.  © 2019 [employer] (2019-05-08 14:44:44)

## 2019-12-11 NOTE — PROGRESS NOTES
Subjective   Idania Mccray is a 64 y.o. female.     Cough   This is a new problem. The current episode started 1 to 4 weeks ago. The problem has been gradually worsening. The problem occurs every few minutes. The cough is non-productive. Associated symptoms include ear congestion, nasal congestion, postnasal drip, a sore throat and wheezing (occasional). Pertinent negatives include no chest pain, chills, ear pain, fever, hemoptysis, myalgias, rash, shortness of breath or sweats. The symptoms are aggravated by lying down. Treatments tried: Cephalexin, bromfed, tessalon perles, promethazine with dextromethorphan, flonase. The treatment provided mild relief.       The following portions of the patient's history were reviewed and updated as appropriate: allergies, current medications, past family history, past medical history, past social history, past surgical history and problem list.    Allergies as of 12/09/2019 - Reviewed 12/09/2019   Allergen Reaction Noted   • Augmentin [amoxicillin-pot clavulanate] Diarrhea 08/26/2016       Current Outpatient Medications on File Prior to Visit   Medication Sig   • aspirin 81 MG EC tablet Take 81 mg by mouth Daily.   • atorvastatin (LIPITOR) 10 MG tablet TAKE 1 TABLET BY MOUTH EVERY DAY   • BD PEN NEEDLE GARRETT U/F 32G X 4 MM misc Daily.   • benzonatate (TESSALON PERLES) 100 MG capsule Take 1 capsule by mouth 3 (Three) Times a Day As Needed for Cough.   • Blood Glucose Monitoring Suppl (ONE TOUCH ULTRA MINI) w/Device kit USE AS DIRECTED [E11.9]   • escitalopram (LEXAPRO) 10 MG tablet Take 1 tablet by mouth Daily.   • fluticasone (FLONASE) 50 MCG/ACT nasal spray 2 sprays into the nostril(s) as directed by provider Daily.   • insulin aspart (NOVOLOG FLEXPEN) 100 UNIT/ML solution pen-injector sc pen Take 10 units before meals if glucose over 250   • Insulin Degludec (TRESIBA SC) Inject 20 Units under the skin into the appropriate area as directed Every Night.   • Lancet Devices (ONE  TOUCH DELICA LANCING DEV) misc USE AS DIRECTED TO TEST BG DAILY DX CODE: E11.65   • levothyroxine (SYNTHROID, LEVOTHROID) 75 MCG tablet Take 75 mcg by mouth Daily.   • lisinopril (PRINIVIL,ZESTRIL) 20 MG tablet Take 0.5 tablets by mouth Daily.   • metFORMIN ER (GLUCOPHAGE-XR) 500 MG 24 hr tablet Take 1,000 mg by mouth 2 (Two) Times a Day.   • ONE TOUCH ULTRA TEST test strip 1 each by Other route 2 (Two) Times a Day. Use as instructed   • ONETOUCH DELICA LANCETS FINE misc USE TO TEST TWICE A DAY [E11.9]   • TRESIBA FLEXTOUCH 100 UNIT/ML solution pen-injector injection Inject 23 Units under the skin into the appropriate area as directed Every Night.   • TRULICITY 1.5 MG/0.5ML solution pen-injector INJECT AS DIRECTED ONCE A WEEK   • ondansetron ODT (ZOFRAN-ODT) 4 MG disintegrating tablet Take 1 tablet by mouth Every 8 (Eight) Hours As Needed for Nausea or Vomiting.   • promethazine-dextromethorphan (PROMETHAZINE-DM) 6.25-15 MG/5ML syrup Take 5 mL by mouth At Night As Needed for Cough.     No current facility-administered medications on file prior to visit.        Review of Systems   Constitutional: Positive for fatigue. Negative for appetite change, chills, diaphoresis and fever.   HENT: Positive for congestion, postnasal drip, sinus pressure, sinus pain, sore throat and voice change. Negative for ear discharge, ear pain and facial swelling.    Respiratory: Positive for cough, chest tightness and wheezing (occasional). Negative for hemoptysis and shortness of breath.    Cardiovascular: Negative.  Negative for chest pain.   Gastrointestinal: Negative.    Musculoskeletal: Negative for myalgias.   Skin: Negative for rash.       Objective   Physical Exam   Constitutional: She is oriented to person, place, and time. Vital signs are normal. She appears well-developed and well-nourished. She is cooperative. No distress.   HENT:   Head: Normocephalic and atraumatic.   Right Ear: External ear normal. Tympanic membrane is  injected and bulging. A middle ear effusion is present.   Left Ear: External ear normal. Tympanic membrane is injected and bulging. A middle ear effusion is present.   Nose: Mucosal edema present. Right sinus exhibits maxillary sinus tenderness. Left sinus exhibits maxillary sinus tenderness.   Mouth/Throat: Uvula is midline and mucous membranes are normal. Posterior oropharyngeal erythema (moderate with cobblestoning) present.   Neck: Normal range of motion. Neck supple. No thyromegaly present.   Cardiovascular: Normal rate, regular rhythm and normal heart sounds.   Pulmonary/Chest: Effort normal. No stridor. No respiratory distress. She has no decreased breath sounds. She has wheezes. She has no rhonchi. She has no rales.   Abdominal: Soft. She exhibits no distension. There is no tenderness.   Lymphadenopathy:     She has no cervical adenopathy.   Neurological: She is alert and oriented to person, place, and time.   Skin: Skin is warm, dry and intact. No rash noted. She is not diaphoretic.   Psychiatric: She has a normal mood and affect. Her behavior is normal. Judgment and thought content normal.   Vitals reviewed.    Vitals:    12/09/19 1637   BP: 110/64   Pulse: 71   Resp: 18   Temp: 97.7 °F (36.5 °C)   SpO2: 99%     Body mass index is 25.64 kg/m².    Assessment/Plan   Idania was seen today for cough.    Diagnoses and all orders for this visit:    Acute non-recurrent maxillary sinusitis  -     doxycycline (VIBRAMYCIN) 100 MG capsule; Take 1 capsule by mouth 2 (Two) Times a Day for 7 days.    Bronchitis  -     beclomethasone (QVAR) 80 MCG/ACT inhaler; Inhale 1 puff 2 (Two) Times a Day.     Discussed the nature of the medical condition(s) risks, complications, implications, management, safe and proper use of medications. Encouraged medication compliance, and keeping scheduled follow up appointments with me and any other providers.     RTC if symptoms fail to improve, to ER if symptoms worsen.

## 2020-01-21 ENCOUNTER — OFFICE VISIT (OUTPATIENT)
Dept: FAMILY MEDICINE CLINIC | Facility: CLINIC | Age: 65
End: 2020-01-21

## 2020-01-21 VITALS
HEIGHT: 62 IN | RESPIRATION RATE: 20 BRPM | WEIGHT: 146 LBS | DIASTOLIC BLOOD PRESSURE: 62 MMHG | TEMPERATURE: 97.4 F | BODY MASS INDEX: 26.87 KG/M2 | OXYGEN SATURATION: 98 % | HEART RATE: 80 BPM | SYSTOLIC BLOOD PRESSURE: 124 MMHG

## 2020-01-21 DIAGNOSIS — E11.65 TYPE 2 DIABETES MELLITUS WITH HYPERGLYCEMIA, WITH LONG-TERM CURRENT USE OF INSULIN (HCC): ICD-10-CM

## 2020-01-21 DIAGNOSIS — J01.40 ACUTE NON-RECURRENT PANSINUSITIS: Primary | ICD-10-CM

## 2020-01-21 DIAGNOSIS — Z79.4 TYPE 2 DIABETES MELLITUS WITH HYPERGLYCEMIA, WITH LONG-TERM CURRENT USE OF INSULIN (HCC): ICD-10-CM

## 2020-01-21 PROCEDURE — 83036 HEMOGLOBIN GLYCOSYLATED A1C: CPT | Performed by: NURSE PRACTITIONER

## 2020-01-21 PROCEDURE — 99214 OFFICE O/P EST MOD 30 MIN: CPT | Performed by: NURSE PRACTITIONER

## 2020-01-21 RX ORDER — AZITHROMYCIN 250 MG/1
TABLET, FILM COATED ORAL
Qty: 6 TABLET | Refills: 0 | Status: SHIPPED | OUTPATIENT
Start: 2020-01-21 | End: 2020-06-30

## 2020-01-21 NOTE — PATIENT INSTRUCTIONS
Sinusitis, Adult  Sinusitis is inflammation of your sinuses. Sinuses are hollow spaces in the bones around your face. Your sinuses are located:  · Around your eyes.  · In the middle of your forehead.  · Behind your nose.  · In your cheekbones.  Mucus normally drains out of your sinuses. When your nasal tissues become inflamed or swollen, mucus can become trapped or blocked. This allows bacteria, viruses, and fungi to grow, which leads to infection. Most infections of the sinuses are caused by a virus.  Sinusitis can develop quickly. It can last for up to 4 weeks (acute) or for more than 12 weeks (chronic). Sinusitis often develops after a cold.  What are the causes?  This condition is caused by anything that creates swelling in the sinuses or stops mucus from draining. This includes:  · Allergies.  · Asthma.  · Infection from bacteria or viruses.  · Deformities or blockages in your nose or sinuses.  · Abnormal growths in the nose (nasal polyps).  · Pollutants, such as chemicals or irritants in the air.  · Infection from fungi (rare).  What increases the risk?  You are more likely to develop this condition if you:  · Have a weak body defense system (immune system).  · Do a lot of swimming or diving.  · Overuse nasal sprays.  · Smoke.  What are the signs or symptoms?  The main symptoms of this condition are pain and a feeling of pressure around the affected sinuses. Other symptoms include:  · Stuffy nose or congestion.  · Thick drainage from your nose.  · Swelling and warmth over the affected sinuses.  · Headache.  · Upper toothache.  · A cough that may get worse at night.  · Extra mucus that collects in the throat or the back of the nose (postnasal drip).  · Decreased sense of smell and taste.  · Fatigue.  · A fever.  · Sore throat.  · Bad breath.  How is this diagnosed?  This condition is diagnosed based on:  · Your symptoms.  · Your medical history.  · A physical exam.  · Tests to find out if your condition is  acute or chronic. This may include:  ? Checking your nose for nasal polyps.  ? Viewing your sinuses using a device that has a light (endoscope).  ? Testing for allergies or bacteria.  ? Imaging tests, such as an MRI or CT scan.  In rare cases, a bone biopsy may be done to rule out more serious types of fungal sinus disease.  How is this treated?  Treatment for sinusitis depends on the cause and whether your condition is chronic or acute.  · If caused by a virus, your symptoms should go away on their own within 10 days. You may be given medicines to relieve symptoms. They include:  ? Medicines that shrink swollen nasal passages (topical intranasal decongestants).  ? Medicines that treat allergies (antihistamines).  ? A spray that eases inflammation of the nostrils (topical intranasal corticosteroids).  ? Rinses that help get rid of thick mucus in your nose (nasal saline washes).  · If caused by bacteria, your health care provider may recommend waiting to see if your symptoms improve. Most bacterial infections will get better without antibiotic medicine. You may be given antibiotics if you have:  ? A severe infection.  ? A weak immune system.  · If caused by narrow nasal passages or nasal polyps, you may need to have surgery.  Follow these instructions at home:  Medicines  · Take, use, or apply over-the-counter and prescription medicines only as told by your health care provider. These may include nasal sprays.  · If you were prescribed an antibiotic medicine, take it as told by your health care provider. Do not stop taking the antibiotic even if you start to feel better.  Hydrate and humidify    · Drink enough fluid to keep your urine pale yellow. Staying hydrated will help to thin your mucus.  · Use a cool mist humidifier to keep the humidity level in your home above 50%.  · Inhale steam for 10-15 minutes, 3-4 times a day, or as told by your health care provider. You can do this in the bathroom while a hot shower is  running.  · Limit your exposure to cool or dry air.  Rest  · Rest as much as possible.  · Sleep with your head raised (elevated).  · Make sure you get enough sleep each night.  General instructions    · Apply a warm, moist washcloth to your face 3-4 times a day or as told by your health care provider. This will help with discomfort.  · Wash your hands often with soap and water to reduce your exposure to germs. If soap and water are not available, use hand .  · Do not smoke. Avoid being around people who are smoking (secondhand smoke).  · Keep all follow-up visits as told by your health care provider. This is important.  Contact a health care provider if:  · You have a fever.  · Your symptoms get worse.  · Your symptoms do not improve within 10 days.  Get help right away if:  · You have a severe headache.  · You have persistent vomiting.  · You have severe pain or swelling around your face or eyes.  · You have vision problems.  · You develop confusion.  · Your neck is stiff.  · You have trouble breathing.  Summary  · Sinusitis is soreness and inflammation of your sinuses. Sinuses are hollow spaces in the bones around your face.  · This condition is caused by nasal tissues that become inflamed or swollen. The swelling traps or blocks the flow of mucus. This allows bacteria, viruses, and fungi to grow, which leads to infection.  · If you were prescribed an antibiotic medicine, take it as told by your health care provider. Do not stop taking the antibiotic even if you start to feel better.  · Keep all follow-up visits as told by your health care provider. This is important.  This information is not intended to replace advice given to you by your health care provider. Make sure you discuss any questions you have with your health care provider.  Document Released: 12/18/2006 Document Revised: 05/20/2019 Document Reviewed: 05/20/2019  [employer] Interactive Patient Education © 2019 [employer]

## 2020-01-21 NOTE — PROGRESS NOTES
CHIEF COMPLAINT:  URI     URI    This is a new problem. The current episode started 1 to 4 weeks ago (1 week ago). The problem has been gradually improving. There has been no fever. Associated symptoms include congestion (head), coughing, headaches, nausea, a plugged ear sensation and sinus pain. Pertinent negatives include no chest pain, diarrhea, ear pain, rash, rhinorrhea, sneezing, sore throat, vomiting or wheezing. Treatments tried: Sita seltzer cold and flu. The treatment provided mild relief.     11/16/19- diagnosed with sinusitis and treated with cephalexin  12/9/19 - diagnosed with sinusitis and treated with doxycycline   Pt requesting Z trudy    DM type 2  Chronic, pt last followed up with PCP in September and was supposed to f/u in 6 weeks, but did not make the appt.  She was also supposed to f/u with endo in December and did not follow up with them either.  Pt wants her Hgb A1c checked today.   Lab Results   Component Value Date    HGBA1C 12.2 12/05/2018     Review of Systems   Constitutional: Negative for fatigue and fever.   HENT: Positive for congestion (head). Negative for ear pain, postnasal drip, rhinorrhea, sneezing and sore throat.    Eyes: Negative for discharge and itching.   Respiratory: Positive for cough. Negative for wheezing.    Cardiovascular: Negative for chest pain and palpitations.   Gastrointestinal: Positive for nausea. Negative for constipation, diarrhea and vomiting.   Musculoskeletal: Negative for myalgias and neck stiffness.   Skin: Negative for rash.   Allergic/Immunologic: Negative for environmental allergies.   Neurological: Negative for dizziness and headache.      The following portions of the patient's history were reviewed and updated as appropriate: allergies, current medications, past family history, past medical history, past social history, past surgical history and problem list.    Visit Vitals  /62   Pulse 80   Temp 97.4 °F (36.3 °C) (Temporal)   Resp 20   Ht  "157.5 cm (62\")   Wt 66.2 kg (146 lb)   LMP  (LMP Unknown)   SpO2 98%   BMI 26.70 kg/m²      Physical Exam   Constitutional: She is oriented to person, place, and time. Vital signs are normal. She appears well-developed and well-nourished. She is cooperative.  Non-toxic appearance. She does not have a sickly appearance. She does not appear ill. No distress. She appears overweight.   HENT:   Head: Normocephalic.   Right Ear: Hearing, tympanic membrane, external ear and ear canal normal. Tympanic membrane is not injected and not erythematous. No middle ear effusion. cerumen impaction is not present.  Left Ear: Hearing, tympanic membrane, external ear and ear canal normal. Tympanic membrane is not injected and not erythematous.  No middle ear effusion. An impacted cerumen is not present.  Nose: Mucosal edema present. No rhinorrhea or congestion. Right sinus exhibits maxillary sinus tenderness and frontal sinus tenderness. Left sinus exhibits maxillary sinus tenderness and frontal sinus tenderness.   Mouth/Throat: Oropharynx is clear and moist and mucous membranes are normal. No oropharyngeal exudate, posterior oropharyngeal edema or posterior oropharyngeal erythema. No tonsillar exudate.   Eyes: Pupils are equal, round, and reactive to light. Conjunctivae are normal.   Neck: Neck supple.   Cardiovascular: Normal rate, regular rhythm and normal heart sounds.   Pulmonary/Chest: Effort normal and breath sounds normal. No respiratory distress. She has no decreased breath sounds. She has no wheezes. She has no rhonchi. She has no rales.   Abdominal: There is no tenderness.   Lymphadenopathy:        Head (right side): No submental, no submandibular, no tonsillar, no preauricular and no posterior auricular adenopathy present.        Head (left side): No submental, no submandibular, no tonsillar, no preauricular and no posterior auricular adenopathy present.     She has no cervical adenopathy.        Right: No supraclavicular " adenopathy present.        Left: No supraclavicular adenopathy present.   Neurological: She is alert and oriented to person, place, and time.   Skin: Skin is warm, dry and intact. No rash noted. She is not diaphoretic.   Psychiatric: She has a normal mood and affect. Her speech is normal and behavior is normal. Judgment and thought content normal.   Vitals reviewed.    ASSESSMENT/PLAN  Diagnoses and all orders for this visit:    1. Acute non-recurrent pansinusitis (Primary)  -     azithromycin (ZITHROMAX) 250 MG tablet; Take 2 tablets the first day, then 1 tablet daily for 4 days.  Dispense: 6 tablet; Refill: 0  Recurrent.  Would recommend symptomatic treatment at this time.  Will provide Z trudy to start if symptoms persist after 10 days. Educated patient that Z trudy is not the first line treatment recommendation for sinusitis, but pt states it is what works for her sinus infections.  Avoid smoking and secondhand smoke, increase water intake, apply warm/moist compresses to the face several times daily, use a humidifier and may use nasal saline to moisten nasal passages.  If you start the antibiotic, complete the entire antibiotic prescribed. F/U if symptoms do not start to improve after 3-5 days on the antibiotic.      2. Type 2 diabetes mellitus with hyperglycemia, with long-term current use of insulin (CMS/[facility])  -     Hemoglobin A1c  Will check lab. Pt to f/u with endo and PCP.    Return if symptoms worsen or fail to improve.  Discussed the nature of the medical condition(s) risks, complications, management, safe and proper use of medications. Encouraged medication compliance and the importance of keeping scheduled follow up appointments with me and any other providers.  Patient instructed to follow up with our office for results on any labs/imaging ordered during this visit.  Patient verbalizes understanding and agrees to treatment plan.

## 2020-01-22 LAB — HBA1C MFR BLD: 7.26 % (ref 4.8–5.6)

## 2020-01-24 ENCOUNTER — TELEPHONE (OUTPATIENT)
Dept: FAMILY MEDICINE CLINIC | Facility: CLINIC | Age: 65
End: 2020-01-24

## 2020-01-24 NOTE — TELEPHONE ENCOUNTER
PT WAS RETURNING A CALL IN REGARDS TO HER A1C AND WOULD LIKE TO GET A CALL BACK [phone] PATIENT STATES THAT IT IS OKAY TO LEAVE RESULTS ON VOICEMAIL IF SHE CAN NOT BE REACHED.

## 2020-02-09 RX ORDER — ATORVASTATIN CALCIUM 10 MG/1
TABLET, FILM COATED ORAL
Qty: 90 TABLET | Refills: 0 | Status: SHIPPED | OUTPATIENT
Start: 2020-02-09 | End: 2020-05-04

## 2020-04-02 RX ORDER — ESCITALOPRAM OXALATE 10 MG/1
TABLET ORAL
Qty: 90 TABLET | Refills: 1 | Status: SHIPPED | OUTPATIENT
Start: 2020-04-02 | End: 2020-09-23

## 2020-05-04 RX ORDER — ATORVASTATIN CALCIUM 10 MG/1
TABLET, FILM COATED ORAL
Qty: 90 TABLET | Refills: 0 | Status: SHIPPED | OUTPATIENT
Start: 2020-05-04 | End: 2020-07-29

## 2020-06-30 ENCOUNTER — OFFICE VISIT (OUTPATIENT)
Dept: FAMILY MEDICINE CLINIC | Facility: CLINIC | Age: 65
End: 2020-06-30

## 2020-06-30 VITALS
HEART RATE: 66 BPM | SYSTOLIC BLOOD PRESSURE: 126 MMHG | OXYGEN SATURATION: 99 % | DIASTOLIC BLOOD PRESSURE: 70 MMHG | WEIGHT: 145 LBS | HEIGHT: 62 IN | RESPIRATION RATE: 18 BRPM | TEMPERATURE: 97.8 F | BODY MASS INDEX: 26.68 KG/M2

## 2020-06-30 DIAGNOSIS — I10 ESSENTIAL HYPERTENSION: ICD-10-CM

## 2020-06-30 DIAGNOSIS — E11.65 TYPE 2 DIABETES MELLITUS WITH HYPERGLYCEMIA, WITH LONG-TERM CURRENT USE OF INSULIN (HCC): Primary | ICD-10-CM

## 2020-06-30 DIAGNOSIS — J01.00 ACUTE MAXILLARY SINUSITIS, RECURRENCE NOT SPECIFIED: ICD-10-CM

## 2020-06-30 DIAGNOSIS — Z79.4 TYPE 2 DIABETES MELLITUS WITH HYPERGLYCEMIA, WITH LONG-TERM CURRENT USE OF INSULIN (HCC): Primary | ICD-10-CM

## 2020-06-30 LAB — HBA1C MFR BLD: 9.5 %

## 2020-06-30 PROCEDURE — 83036 HEMOGLOBIN GLYCOSYLATED A1C: CPT | Performed by: FAMILY MEDICINE

## 2020-06-30 PROCEDURE — 99213 OFFICE O/P EST LOW 20 MIN: CPT | Performed by: FAMILY MEDICINE

## 2020-06-30 RX ORDER — LISINOPRIL 20 MG/1
10 TABLET ORAL DAILY
Qty: 90 TABLET | Refills: 1 | Status: SHIPPED | OUTPATIENT
Start: 2020-06-30 | End: 2021-01-14 | Stop reason: SDUPTHER

## 2020-06-30 RX ORDER — AZITHROMYCIN 250 MG/1
TABLET, FILM COATED ORAL
Qty: 6 TABLET | Refills: 0 | Status: SHIPPED | OUTPATIENT
Start: 2020-06-30 | End: 2020-11-12 | Stop reason: SDUPTHER

## 2020-07-29 RX ORDER — ATORVASTATIN CALCIUM 10 MG/1
TABLET, FILM COATED ORAL
Qty: 90 TABLET | Refills: 0 | Status: SHIPPED | OUTPATIENT
Start: 2020-07-29 | End: 2020-10-21

## 2020-09-23 RX ORDER — ESCITALOPRAM OXALATE 10 MG/1
TABLET ORAL
Qty: 90 TABLET | Refills: 1 | Status: SHIPPED | OUTPATIENT
Start: 2020-09-23 | End: 2021-02-24

## 2020-10-19 RX ORDER — PEN NEEDLE, DIABETIC 32GX 5/32"
NEEDLE, DISPOSABLE MISCELLANEOUS
Qty: 200 EACH | Refills: 1 | Status: SHIPPED | OUTPATIENT
Start: 2020-10-19 | End: 2021-04-07

## 2020-10-19 RX ORDER — LEVOTHYROXINE SODIUM 0.07 MG/1
75 TABLET ORAL DAILY
Qty: 30 TABLET | Refills: 3 | Status: SHIPPED | OUTPATIENT
Start: 2020-10-19 | End: 2020-12-16

## 2020-10-19 NOTE — TELEPHONE ENCOUNTER
Pt has a future appt on 11/09/2020 amd is requesting Rx refill of  levothyroxine (SYNTHROID, LEVOTHROID) 75 MCG tablet and she wasn't sure but she believes the other Rx is called BD PEN NEEDLE GARRETT U/F 32G X 4 MM misc. She ask that someone reach out to her

## 2020-10-21 RX ORDER — ATORVASTATIN CALCIUM 10 MG/1
TABLET, FILM COATED ORAL
Qty: 30 TABLET | Refills: 0 | Status: SHIPPED | OUTPATIENT
Start: 2020-10-21 | End: 2020-11-12

## 2020-11-09 ENCOUNTER — OFFICE VISIT (OUTPATIENT)
Dept: ENDOCRINOLOGY | Facility: CLINIC | Age: 65
End: 2020-11-09

## 2020-11-09 VITALS
SYSTOLIC BLOOD PRESSURE: 124 MMHG | HEIGHT: 62 IN | HEART RATE: 71 BPM | OXYGEN SATURATION: 99 % | TEMPERATURE: 98.2 F | WEIGHT: 146.4 LBS | DIASTOLIC BLOOD PRESSURE: 70 MMHG | BODY MASS INDEX: 26.94 KG/M2

## 2020-11-09 DIAGNOSIS — I10 ESSENTIAL HYPERTENSION: ICD-10-CM

## 2020-11-09 DIAGNOSIS — E11.65 UNCONTROLLED TYPE 2 DIABETES MELLITUS WITH HYPERGLYCEMIA (HCC): Primary | ICD-10-CM

## 2020-11-09 LAB
EXPIRATION DATE: NORMAL
HBA1C MFR BLD: 10.6 %
Lab: NORMAL

## 2020-11-09 PROCEDURE — 99213 OFFICE O/P EST LOW 20 MIN: CPT | Performed by: INTERNAL MEDICINE

## 2020-11-09 PROCEDURE — 83036 HEMOGLOBIN GLYCOSYLATED A1C: CPT | Performed by: INTERNAL MEDICINE

## 2020-11-09 RX ORDER — INSULIN ASPART 100 [IU]/ML
INJECTION, SOLUTION INTRAVENOUS; SUBCUTANEOUS
Qty: 3 ML | Refills: 5 | Status: SHIPPED | OUTPATIENT
Start: 2020-11-09 | End: 2021-12-02

## 2020-11-09 RX ORDER — BLOOD-GLUCOSE METER
KIT MISCELLANEOUS
Qty: 1 EACH | Refills: 0 | Status: SHIPPED | OUTPATIENT
Start: 2020-11-09

## 2020-11-09 RX ORDER — LANCETS 33 GAUGE
EACH MISCELLANEOUS
Qty: 400 EACH | Refills: 1 | Status: SHIPPED | OUTPATIENT
Start: 2020-11-09

## 2020-11-09 NOTE — ASSESSMENT & PLAN NOTE
Diabetes is worsening.   Continue current treatment regimen.  Diabetes will be reassessed in 3 months.    She lost her meter.  She hasn't been taking novolog.  Resume novolog with meals.  Will send in Rx for glucometer.

## 2020-11-09 NOTE — PROGRESS NOTES
"     Office Note      Date: 2020  Patient Name: Idania Mccray  MRN: [id number]  : 1955    Chief Complaint   Patient presents with   • Follow-up   • Diabetes       History of Present Illness:   Idania Mccray is a 65 y.o. female who presents for Diabetes type 2. Diagnosed in: 2017. Treated in past with oral agents. Current treatments: metformin, trulicity and basal insulin. Number of insulin shots per day: 1. Checks blood sugar none times a day. Has low blood sugar: no. Aspirin use: Yes. Statin use: Yes. ACE-I/ARB use: Yes. Changes in health since last visit: none. Last eye exam .    Subjective      Diabetic Complications:  Eyes: No  Kidneys: No  Feet: No  Heart: No    Diet and Exercise:  Meals per day: 3  Minutes of exercise per week: 0 mins.    Review of Systems:   Review of Systems   Constitutional: Negative.    Cardiovascular: Negative.    Gastrointestinal: Negative.    Endocrine: Negative.        The following portions of the patient's history were reviewed and updated as appropriate: allergies, current medications, past family history, past medical history, past social history, past surgical history and problem list.    Objective       Visit Vitals  /70   Pulse 71   Temp 98.2 °F (36.8 °C) (Infrared)   Ht 157.5 cm (62\")   Wt 66.4 kg (146 lb 6.4 oz)   LMP  (LMP Unknown)   SpO2 99%   BMI 26.78 kg/m²       Physical Exam:  Physical Exam  Constitutional:       Appearance: Normal appearance.   Neurological:      Mental Status: She is alert.         Labs:    HbA1c  Lab Results   Component Value Date    HGBA1C 10.6 2020       CMP  Lab Results   Component Value Date    GLUCOSE 396 (H) 2019    BUN 14 2019    CREATININE 0.80 2019    EGFRIFNONA 72 2019    BCR 17.5 2019    K 4.8 2019    CO2 26.0 2019    CALCIUM 9.9 2019    AST 17 2019    ALT 17 2019        Lipid Panel  Lab Results   Component Value Date    CHLPL 209 (H) 2015    " HDL 60 12/07/2018     (H) 12/07/2018    TRIG 102 12/07/2018        TSH  Lab Results   Component Value Date    TSH 0.010 (L) 12/26/2017        Hemoglobin A1C  Lab Results   Component Value Date    HGBA1C 10.6 11/09/2020        Microalbumin/Creatinine  No results found for: MALBCRERATIO, CREATINIURIN, MICROALBUR        Assessment / Plan      Assessment & Plan:  Problem List Items Addressed This Visit        Cardiovascular and Mediastinum    Hypertension    Current Assessment & Plan     BP okay.         Relevant Medications    lisinopril (PRINIVIL,ZESTRIL) 20 MG tablet       Endocrine    Uncontrolled type 2 diabetes mellitus with hyperglycemia (CMS/[facility]) - Primary    Current Assessment & Plan     Diabetes is worsening.   Continue current treatment regimen.  Diabetes will be reassessed in 3 months.    She lost her meter.  She hasn't been taking novolog.  Resume novolog with meals.  Will send in Rx for glucometer.         Relevant Medications    metFORMIN ER (GLUCOPHAGE-XR) 500 MG 24 hr tablet    TRULICITY 1.5 MG/0.5ML solution pen-injector    TRESIBA FLEXTOUCH 100 UNIT/ML solution pen-injector injection    insulin aspart (NovoLOG FlexPen) 100 UNIT/ML solution pen-injector sc pen    Other Relevant Orders    POC Glycosylated Hemoglobin (Hb A1C) (Completed)           Return in about 3 months (around 2/9/2021) for Recheck with A1c, CMP, lipids, TSH, microalbumin.    Hiro Hamlin MD   11/09/2020

## 2020-11-12 ENCOUNTER — OFFICE VISIT (OUTPATIENT)
Dept: FAMILY MEDICINE CLINIC | Facility: CLINIC | Age: 65
End: 2020-11-12

## 2020-11-12 DIAGNOSIS — J01.00 SUBACUTE MAXILLARY SINUSITIS: Primary | ICD-10-CM

## 2020-11-12 PROCEDURE — 99213 OFFICE O/P EST LOW 20 MIN: CPT | Performed by: FAMILY MEDICINE

## 2020-11-12 RX ORDER — ATORVASTATIN CALCIUM 10 MG/1
TABLET, FILM COATED ORAL
Qty: 90 TABLET | Refills: 0 | Status: SHIPPED | OUTPATIENT
Start: 2020-11-12 | End: 2021-05-19

## 2020-11-12 RX ORDER — AZITHROMYCIN 250 MG/1
TABLET, FILM COATED ORAL
Qty: 6 TABLET | Refills: 0 | Status: SHIPPED | OUTPATIENT
Start: 2020-11-12 | End: 2021-04-06 | Stop reason: SDUPTHER

## 2020-11-13 ENCOUNTER — TELEPHONE (OUTPATIENT)
Dept: ENDOCRINOLOGY | Facility: CLINIC | Age: 65
End: 2020-11-13

## 2020-11-13 RX ORDER — METFORMIN HYDROCHLORIDE 500 MG/1
1000 TABLET, EXTENDED RELEASE ORAL 2 TIMES DAILY
Qty: 360 TABLET | Refills: 1 | Status: SHIPPED | OUTPATIENT
Start: 2020-11-13 | End: 2021-05-11

## 2020-11-13 NOTE — PROGRESS NOTES
Subjective   Idania Mccray is a 65 y.o. female.     Sinusitis  This is a new problem. The current episode started in the past 7 days. The problem is unchanged. There has been no fever. She is experiencing no pain. Associated symptoms include congestion and sinus pressure. Pertinent negatives include no chills, coughing, shortness of breath, sore throat or swollen glands. Past treatments include oral decongestants. The treatment provided no relief.        The following portions of the patient's history were reviewed and updated as appropriate: allergies, current medications, past social history and problem list.    Review of Systems   Constitutional: Negative for chills and fever.   HENT: Positive for congestion, postnasal drip and sinus pressure. Negative for sore throat.    Respiratory: Negative for cough and shortness of breath.    Cardiovascular: Negative for chest pain and palpitations.   Gastrointestinal: Negative for diarrhea, nausea and vomiting.       Objective   LMP  (LMP Unknown)   Physical Exam  Neurological:      Mental Status: She is alert.   Psychiatric:         Mood and Affect: Mood normal.     Unable to complete visit using a video connection to the patient. A phone visit was used to complete this visits. Total time of discussion was 14 minutes.    Assessment/Plan   Problems Addressed this Visit     None      Visit Diagnoses     Subacute maxillary sinusitis    -  Primary    Relevant Medications    azithromycin (Zithromax) 250 MG tablet      Diagnoses       Codes Comments    Subacute maxillary sinusitis    -  Primary ICD-10-CM: J01.00  ICD-9-CM: 461.0           New Medications Ordered This Visit   Medications   • azithromycin (Zithromax) 250 MG tablet     Sig: Take 2 tablets the first day, then 1 tablet daily for 4 days.     Dispense:  6 tablet     Refill:  0

## 2020-12-16 RX ORDER — LEVOTHYROXINE SODIUM 0.07 MG/1
TABLET ORAL
Qty: 90 TABLET | Refills: 1 | Status: SHIPPED | OUTPATIENT
Start: 2020-12-16 | End: 2021-06-29 | Stop reason: SDUPTHER

## 2021-01-14 RX ORDER — INSULIN DEGLUDEC INJECTION 100 U/ML
23 INJECTION, SOLUTION SUBCUTANEOUS NIGHTLY
Qty: 15 ML | Refills: 3 | Status: SHIPPED | OUTPATIENT
Start: 2021-01-14 | End: 2021-03-08 | Stop reason: SDUPTHER

## 2021-01-14 RX ORDER — LISINOPRIL 20 MG/1
10 TABLET ORAL DAILY
Qty: 90 TABLET | Refills: 1 | Status: SHIPPED | OUTPATIENT
Start: 2021-01-14 | End: 2021-06-29 | Stop reason: SDUPTHER

## 2021-02-24 RX ORDER — ESCITALOPRAM OXALATE 10 MG/1
TABLET ORAL
Qty: 90 TABLET | Refills: 0 | Status: SHIPPED | OUTPATIENT
Start: 2021-02-24 | End: 2021-10-01 | Stop reason: SDUPTHER

## 2021-03-08 RX ORDER — INSULIN DEGLUDEC INJECTION 100 U/ML
23 INJECTION, SOLUTION SUBCUTANEOUS NIGHTLY
Qty: 15 ML | Refills: 3 | Status: SHIPPED | OUTPATIENT
Start: 2021-03-08 | End: 2022-04-05

## 2021-04-06 ENCOUNTER — TELEMEDICINE (OUTPATIENT)
Dept: FAMILY MEDICINE CLINIC | Facility: CLINIC | Age: 66
End: 2021-04-06

## 2021-04-06 DIAGNOSIS — J01.00 SUBACUTE MAXILLARY SINUSITIS: ICD-10-CM

## 2021-04-06 PROCEDURE — 99213 OFFICE O/P EST LOW 20 MIN: CPT | Performed by: FAMILY MEDICINE

## 2021-04-06 RX ORDER — BENZONATATE 200 MG/1
200 CAPSULE ORAL 3 TIMES DAILY PRN
Qty: 30 CAPSULE | Refills: 0 | Status: SHIPPED | OUTPATIENT
Start: 2021-04-06 | End: 2022-04-05

## 2021-04-06 RX ORDER — AZITHROMYCIN 250 MG/1
TABLET, FILM COATED ORAL
Qty: 6 TABLET | Refills: 0 | Status: SHIPPED | OUTPATIENT
Start: 2021-04-06 | End: 2021-06-24

## 2021-04-07 RX ORDER — PEN NEEDLE, DIABETIC 32GX 5/32"
NEEDLE, DISPOSABLE MISCELLANEOUS
Qty: 400 EACH | Refills: 1 | Status: SHIPPED | OUTPATIENT
Start: 2021-04-07 | End: 2022-04-22

## 2021-04-07 NOTE — PROGRESS NOTES
Chief Complaint  No chief complaint on file.   Televisit for yellow thick postnasal drip red throat no exudate issues.    Subjective          Idania Mccray presents to [facility]  Sinusitis  This is a new problem. The current episode started in the past 7 days. The problem is unchanged. There has been no fever. The pain is mild. Associated symptoms include congestion and sinus pressure. Pertinent negatives include no shortness of breath or sore throat. Past treatments include oral decongestants and acetaminophen. The treatment provided mild relief.       Objective   Vital Signs:   There were no vitals taken for this visit.    Physical Exam  Neurological:      Mental Status: She is alert.   Psychiatric:         Mood and Affect: Mood normal.        Result Review :                 Assessment and Plan    Diagnoses and all orders for this visit:    1. Subacute maxillary sinusitis  -     azithromycin (Zithromax) 250 MG tablet; Take 2 tablets the first day, then 1 tablet daily for 4 days.  Dispense: 6 tablet; Refill: 0  -     benzonatate (TESSALON) 200 MG capsule; Take 1 capsule by mouth 3 (Three) Times a Day As Needed for Cough.  Dispense: 30 capsule; Refill: 0        Follow Up   No follow-ups on file.  Patient was given instructions and counseling regarding her condition or for health maintenance advice. Please see specific information pulled into the AVS if appropriate.   Unable to complete visit using a video connection to the patient. A phone visit was used to complete this visits. Total time of discussion was 12 minutes.

## 2021-04-23 ENCOUNTER — TELEPHONE (OUTPATIENT)
Dept: ENDOCRINOLOGY | Facility: CLINIC | Age: 66
End: 2021-04-23

## 2021-04-23 NOTE — TELEPHONE ENCOUNTER
Pt was dropped from her insurance because her  retired and is unable to get her prescription for trulicity. Pt was wondering if she could  trulicity samples from the office. Pt also stated she has been out of trulicity for 6 weeks, please advise.

## 2021-05-11 RX ORDER — METFORMIN HYDROCHLORIDE 500 MG/1
TABLET, EXTENDED RELEASE ORAL
Qty: 360 TABLET | Refills: 1 | Status: SHIPPED | OUTPATIENT
Start: 2021-05-11 | End: 2021-06-29 | Stop reason: SDUPTHER

## 2021-05-19 RX ORDER — ATORVASTATIN CALCIUM 10 MG/1
TABLET, FILM COATED ORAL
Qty: 30 TABLET | Refills: 0 | Status: SHIPPED | OUTPATIENT
Start: 2021-05-19 | End: 2022-04-05 | Stop reason: ALTCHOICE

## 2021-06-24 ENCOUNTER — OFFICE VISIT (OUTPATIENT)
Dept: FAMILY MEDICINE CLINIC | Facility: CLINIC | Age: 66
End: 2021-06-24

## 2021-06-24 VITALS
SYSTOLIC BLOOD PRESSURE: 110 MMHG | HEIGHT: 62 IN | TEMPERATURE: 97 F | HEART RATE: 65 BPM | DIASTOLIC BLOOD PRESSURE: 60 MMHG | BODY MASS INDEX: 27.23 KG/M2 | WEIGHT: 148 LBS | RESPIRATION RATE: 20 BRPM | OXYGEN SATURATION: 98 %

## 2021-06-24 DIAGNOSIS — H02.843 EDEMA OF EYELID OF BOTH EYES: ICD-10-CM

## 2021-06-24 DIAGNOSIS — H02.846 EDEMA OF EYELID OF BOTH EYES: ICD-10-CM

## 2021-06-24 DIAGNOSIS — J01.40 ACUTE PANSINUSITIS, RECURRENCE NOT SPECIFIED: Primary | ICD-10-CM

## 2021-06-24 PROCEDURE — 99213 OFFICE O/P EST LOW 20 MIN: CPT | Performed by: NURSE PRACTITIONER

## 2021-06-24 RX ORDER — FUROSEMIDE 20 MG/1
20 TABLET ORAL 2 TIMES DAILY
Qty: 6 TABLET | Refills: 0 | Status: SHIPPED | OUTPATIENT
Start: 2021-06-24 | End: 2022-04-05

## 2021-06-24 RX ORDER — AZITHROMYCIN 250 MG/1
TABLET, FILM COATED ORAL
Qty: 6 TABLET | Refills: 0 | Status: SHIPPED | OUTPATIENT
Start: 2021-06-24 | End: 2022-04-05

## 2021-06-29 ENCOUNTER — OFFICE VISIT (OUTPATIENT)
Dept: ENDOCRINOLOGY | Facility: CLINIC | Age: 66
End: 2021-06-29

## 2021-06-29 VITALS
HEIGHT: 62 IN | DIASTOLIC BLOOD PRESSURE: 68 MMHG | WEIGHT: 139 LBS | SYSTOLIC BLOOD PRESSURE: 100 MMHG | BODY MASS INDEX: 25.58 KG/M2

## 2021-06-29 DIAGNOSIS — I10 ESSENTIAL HYPERTENSION: ICD-10-CM

## 2021-06-29 DIAGNOSIS — Z79.4 TYPE 2 DIABETES MELLITUS WITH HYPERGLYCEMIA, WITH LONG-TERM CURRENT USE OF INSULIN (HCC): Primary | ICD-10-CM

## 2021-06-29 DIAGNOSIS — E03.9 ACQUIRED HYPOTHYROIDISM: ICD-10-CM

## 2021-06-29 DIAGNOSIS — E11.65 TYPE 2 DIABETES MELLITUS WITH HYPERGLYCEMIA, WITH LONG-TERM CURRENT USE OF INSULIN (HCC): Primary | ICD-10-CM

## 2021-06-29 LAB
EXPIRATION DATE: NORMAL
EXPIRATION DATE: NORMAL
GLUCOSE BLDC GLUCOMTR-MCNC: 83 MG/DL (ref 70–130)
HBA1C MFR BLD: 13.5 %
Lab: NORMAL
Lab: NORMAL

## 2021-06-29 PROCEDURE — 82947 ASSAY GLUCOSE BLOOD QUANT: CPT | Performed by: PHYSICIAN ASSISTANT

## 2021-06-29 PROCEDURE — 83036 HEMOGLOBIN GLYCOSYLATED A1C: CPT | Performed by: PHYSICIAN ASSISTANT

## 2021-06-29 PROCEDURE — 99214 OFFICE O/P EST MOD 30 MIN: CPT | Performed by: PHYSICIAN ASSISTANT

## 2021-06-29 RX ORDER — LEVOTHYROXINE SODIUM 0.07 MG/1
75 TABLET ORAL DAILY
Qty: 90 TABLET | Refills: 1 | Status: SHIPPED | OUTPATIENT
Start: 2021-06-29 | End: 2022-05-27

## 2021-06-29 RX ORDER — LISINOPRIL 20 MG/1
10 TABLET ORAL DAILY
Qty: 90 TABLET | Refills: 1 | Status: SHIPPED | OUTPATIENT
Start: 2021-06-29 | End: 2022-09-19

## 2021-06-29 RX ORDER — METFORMIN HYDROCHLORIDE 500 MG/1
1000 TABLET, EXTENDED RELEASE ORAL 2 TIMES DAILY
Qty: 360 TABLET | Refills: 1 | Status: SHIPPED | OUTPATIENT
Start: 2021-06-29 | End: 2022-04-05

## 2021-06-29 NOTE — PROGRESS NOTES
"     Office Note      Date: 2021  Patient Name: Idania Mccray  MRN: [id number]  : 1955    Chief Complaint   Patient presents with   • Diabetes       History of Present Illness:   Idania Mccray is a 65 y.o. female who presents for type 2 diabetes follow-up appointment.  She reports it has been a rough several months.  Her  had Azul Smith and though recovering had to leave his job due to disability in January.  Patient lost her insurance at this time and has had difficulty affording her medications.  She has been off Trulicity for several months other than getting samples here and there from her primary care physician in our office.  She reports she has also been off her levothyroxine for 3 months.  She reports she was told recently that she will not have any Medicare drug coverage until 2022.  She reports she remains on Metformin extended release 500 mg 2 tablets twice a day, Tresiba 18 units daily and NovoLog 5 units 3 times a day with meals.  She is not surprised her A1c is elevated.  She does not check her blood sugars very regularly but denies any trouble with hypoglycemia.  She reports she has her eye exam scheduled for tomorrow.  She has had both doses of her Covid vaccine.  She denies any trouble with her feet today.    Subjective     Review of Systems:   Review of Systems   Constitutional: Negative.    Cardiovascular: Negative.    Gastrointestinal: Negative.    Endocrine: Negative.    Neurological: Negative.        The following portions of the patient's history were reviewed and updated as appropriate: allergies, current medications, past family history, past medical history, past social history, past surgical history and problem list.    Objective     Vitals:    21 0822   BP: 100/68   BP Location: Left arm   Patient Position: Sitting   Cuff Size: Adult   Weight: 63 kg (139 lb)   Height: 157.5 cm (62\")   PainSc: 0-No pain     Body mass index is 25.42 " kg/m².    Physical Exam  Vitals reviewed.   Constitutional:       General: She is not in acute distress.     Appearance: Normal appearance.   Cardiovascular:      Pulses:           Dorsalis pedis pulses are 2+ on the right side and 2+ on the left side.        Posterior tibial pulses are 2+ on the right side and 2+ on the left side.   Musculoskeletal:      Right foot: No deformity.      Left foot: No deformity.   Feet:      Right foot:      Protective Sensation: 5 sites tested. 5 sites sensed.      Skin integrity: Skin integrity normal.      Toenail Condition: Right toenails are normal.      Left foot:      Protective Sensation: 5 sites tested. 5 sites sensed.      Skin integrity: Skin integrity normal.      Toenail Condition: Left toenails are normal.      Comments: Diabetic Foot Exam Performed and Monofilament Test Performed    Neurological:      Mental Status: She is alert.         HEMOGLOBIN A1C  Lab Results   Component Value Date    HGBA1C 13.5 06/29/2021         Assessment / Plan      Assessment & Plan:  1. Type 2 diabetes mellitus with hyperglycemia, with long-term current use of insulin (CMS/[facility])  Her A1c is well above goal at 13.5% today.  We discussed the importance of getting diabetes under better control.  Her blood glucose this morning is 83 mg/dL and she reports her fasting blood sugars are typically very good.  Now we will increase her NovoLog to 6 units with meals she will continue Tresiba 18 units daily.  We will also continue the Metformin extended release 500 mg 2 tablets twice a day.  I refilled her Metformin today.  I advised her this medication should be very affordable.  We discussed patient assistance through the drug company until she can get drug coverage through Medicare.  I encouraged her to get online the Tresiba and NovoLog [employer] Trulicity website to fill out the forms and gather the appropriate paperwork.  Advised her to bring this to the office once it is completed and we will fax  this sent along with prescriptions to see if she is covered.  Her blood pressure was okay today.  Her weight is down 7 pounds since her appointment in November.  Planned to check fasting labs today but since she has been off of her medications and her blood sugars have been elevated we will hold off on this until her 3 month follow-up for monitoring.  Encouraged healthy eating habits and physical activity as tolerated.  Patient to call as needed.  - POC Glycosylated Hemoglobin (Hb A1C)  - POC Glucose, Blood    2. Essential hypertension  Her blood pressures okay today.  She will continue the lisinopril 20 mg daily.  I refilled this prescription today.    3. Acquired hypothyroidism  She has been off her levothyroxine for at least 2 to 3 months.  I sampled Synthroid 75 mcg daily for now and advised patient the levothyroxine should be very affordable at the pharmacy.  If this is not affordable she should consider checking with other pharmacies.  I sent a new prescription for levothyroxine 75 mcg daily.  We will plan to check her thyroid levels at her follow-up appointment in 3 months.  She inquired about Hashimoto's thyroiditis and we discussed considering checking antibodies when we do labs next appointment.      Return in about 3 months (around 9/29/2021) for Recheck 3-4 mos fasting next appt.     Brandi Rocha PA-C  06/29/2021

## 2021-07-07 NOTE — PATIENT INSTRUCTIONS
Sinusitis, Adult  Sinusitis is inflammation of your sinuses. Sinuses are hollow spaces in the bones around your face. Your sinuses are located:  · Around your eyes.  · In the middle of your forehead.  · Behind your nose.  · In your cheekbones.  Mucus normally drains out of your sinuses. When your nasal tissues become inflamed or swollen, mucus can become trapped or blocked. This allows bacteria, viruses, and fungi to grow, which leads to infection. Most infections of the sinuses are caused by a virus.  Sinusitis can develop quickly. It can last for up to 4 weeks (acute) or for more than 12 weeks (chronic). Sinusitis often develops after a cold.  What are the causes?  This condition is caused by anything that creates swelling in the sinuses or stops mucus from draining. This includes:  · Allergies.  · Asthma.  · Infection from bacteria or viruses.  · Deformities or blockages in your nose or sinuses.  · Abnormal growths in the nose (nasal polyps).  · Pollutants, such as chemicals or irritants in the air.  · Infection from fungi (rare).  What increases the risk?  You are more likely to develop this condition if you:  · Have a weak body defense system (immune system).  · Do a lot of swimming or diving.  · Overuse nasal sprays.  · Smoke.  What are the signs or symptoms?  The main symptoms of this condition are pain and a feeling of pressure around the affected sinuses. Other symptoms include:  · Stuffy nose or congestion.  · Thick drainage from your nose.  · Swelling and warmth over the affected sinuses.  · Headache.  · Upper toothache.  · A cough that may get worse at night.  · Extra mucus that collects in the throat or the back of the nose (postnasal drip).  · Decreased sense of smell and taste.  · Fatigue.  · A fever.  · Sore throat.  · Bad breath.  How is this diagnosed?  This condition is diagnosed based on:  · Your symptoms.  · Your medical history.  · A physical exam.  · Tests to find out if your condition is  acute or chronic. This may include:  ? Checking your nose for nasal polyps.  ? Viewing your sinuses using a device that has a light (endoscope).  ? Testing for allergies or bacteria.  ? Imaging tests, such as an MRI or CT scan.  In rare cases, a bone biopsy may be done to rule out more serious types of fungal sinus disease.  How is this treated?  Treatment for sinusitis depends on the cause and whether your condition is chronic or acute.  · If caused by a virus, your symptoms should go away on their own within 10 days. You may be given medicines to relieve symptoms. They include:  ? Medicines that shrink swollen nasal passages (topical intranasal decongestants).  ? Medicines that treat allergies (antihistamines).  ? A spray that eases inflammation of the nostrils (topical intranasal corticosteroids).  ? Rinses that help get rid of thick mucus in your nose (nasal saline washes).  · If caused by bacteria, your health care provider may recommend waiting to see if your symptoms improve. Most bacterial infections will get better without antibiotic medicine. You may be given antibiotics if you have:  ? A severe infection.  ? A weak immune system.  · If caused by narrow nasal passages or nasal polyps, you may need to have surgery.  Follow these instructions at home:  Medicines  · Take, use, or apply over-the-counter and prescription medicines only as told by your health care provider. These may include nasal sprays.  · If you were prescribed an antibiotic medicine, take it as told by your health care provider. Do not stop taking the antibiotic even if you start to feel better.  Hydrate and humidify    · Drink enough fluid to keep your urine pale yellow. Staying hydrated will help to thin your mucus.  · Use a cool mist humidifier to keep the humidity level in your home above 50%.  · Inhale steam for 10-15 minutes, 3-4 times a day, or as told by your health care provider. You can do this in the bathroom while a hot shower is  running.  · Limit your exposure to cool or dry air.  Rest  · Rest as much as possible.  · Sleep with your head raised (elevated).  · Make sure you get enough sleep each night.  General instructions    · Apply a warm, moist washcloth to your face 3-4 times a day or as told by your health care provider. This will help with discomfort.  · Wash your hands often with soap and water to reduce your exposure to germs. If soap and water are not available, use hand .  · Do not smoke. Avoid being around people who are smoking (secondhand smoke).  · Keep all follow-up visits as told by your health care provider. This is important.  Contact a health care provider if:  · You have a fever.  · Your symptoms get worse.  · Your symptoms do not improve within 10 days.  Get help right away if:  · You have a severe headache.  · You have persistent vomiting.  · You have severe pain or swelling around your face or eyes.  · You have vision problems.  · You develop confusion.  · Your neck is stiff.  · You have trouble breathing.  Summary  · Sinusitis is soreness and inflammation of your sinuses. Sinuses are hollow spaces in the bones around your face.  · This condition is caused by nasal tissues that become inflamed or swollen. The swelling traps or blocks the flow of mucus. This allows bacteria, viruses, and fungi to grow, which leads to infection.  · If you were prescribed an antibiotic medicine, take it as told by your health care provider. Do not stop taking the antibiotic even if you start to feel better.  · Keep all follow-up visits as told by your health care provider. This is important.  This information is not intended to replace advice given to you by your health care provider. Make sure you discuss any questions you have with your health care provider.  Document Revised: 05/20/2019 Document Reviewed: 05/20/2019  [employer]. Patient Education © 2021 [employer]

## 2021-07-13 ENCOUNTER — TELEPHONE (OUTPATIENT)
Dept: ENDOCRINOLOGY | Facility: CLINIC | Age: 66
End: 2021-07-13

## 2021-07-13 NOTE — TELEPHONE ENCOUNTER
Pt returned call to office. Would like a call back. If not reachable today. Please call tomorrow around 11 if possible

## 2021-07-13 NOTE — TELEPHONE ENCOUNTER
Patient is interested in the patient assistance program for her Trulicity and wants to know if we can print out forms from [employer] and fill them out for her then let her know when they're ready so she can pick them up and fill out her part. Meanwhile, she wants to know if we had any samples.

## 2021-07-14 NOTE — TELEPHONE ENCOUNTER
Called pt and told her the form was printed and ready for . And there were no samples. She is going to  form today.

## 2021-08-05 RX ORDER — ATORVASTATIN CALCIUM 10 MG/1
10 TABLET, FILM COATED ORAL DAILY
Qty: 30 TABLET | Refills: 0 | Status: CANCELLED | OUTPATIENT
Start: 2021-08-05

## 2021-09-28 ENCOUNTER — TELEPHONE (OUTPATIENT)
Dept: ENDOCRINOLOGY | Facility: CLINIC | Age: 66
End: 2021-09-28

## 2021-09-28 DIAGNOSIS — E11.65 TYPE 2 DIABETES MELLITUS WITH HYPERGLYCEMIA, WITH LONG-TERM CURRENT USE OF INSULIN: Primary | ICD-10-CM

## 2021-09-28 DIAGNOSIS — Z79.4 TYPE 2 DIABETES MELLITUS WITH HYPERGLYCEMIA, WITH LONG-TERM CURRENT USE OF INSULIN: Primary | ICD-10-CM

## 2021-09-28 DIAGNOSIS — E03.9 ACQUIRED HYPOTHYROIDISM: ICD-10-CM

## 2021-10-01 RX ORDER — ESCITALOPRAM OXALATE 10 MG/1
10 TABLET ORAL DAILY
Qty: 90 TABLET | Refills: 0 | Status: SHIPPED | OUTPATIENT
Start: 2021-10-01 | End: 2021-12-02

## 2021-12-02 RX ORDER — ESCITALOPRAM OXALATE 10 MG/1
TABLET ORAL
Qty: 90 TABLET | Refills: 0 | Status: SHIPPED | OUTPATIENT
Start: 2021-12-02 | End: 2022-02-25

## 2021-12-02 RX ORDER — INSULIN ASPART 100 [IU]/ML
INJECTION, SOLUTION INTRAVENOUS; SUBCUTANEOUS
Qty: 15 ML | Refills: 1 | Status: SHIPPED | OUTPATIENT
Start: 2021-12-02 | End: 2022-04-07 | Stop reason: SDUPTHER

## 2022-02-25 RX ORDER — ESCITALOPRAM OXALATE 10 MG/1
TABLET ORAL
Qty: 90 TABLET | Refills: 0 | Status: SHIPPED | OUTPATIENT
Start: 2022-02-25 | End: 2022-05-26

## 2022-04-05 ENCOUNTER — OFFICE VISIT (OUTPATIENT)
Dept: NEUROLOGY | Facility: CLINIC | Age: 67
End: 2022-04-05

## 2022-04-05 ENCOUNTER — TELEPHONE (OUTPATIENT)
Dept: ENDOCRINOLOGY | Facility: CLINIC | Age: 67
End: 2022-04-05

## 2022-04-05 VITALS
WEIGHT: 142 LBS | OXYGEN SATURATION: 97 % | BODY MASS INDEX: 26.13 KG/M2 | SYSTOLIC BLOOD PRESSURE: 116 MMHG | HEIGHT: 62 IN | HEART RATE: 82 BPM | TEMPERATURE: 96.9 F | DIASTOLIC BLOOD PRESSURE: 62 MMHG

## 2022-04-05 DIAGNOSIS — G24.3 CERVICAL DYSTONIA: Primary | ICD-10-CM

## 2022-04-05 DIAGNOSIS — S04.891D: ICD-10-CM

## 2022-04-05 PROBLEM — S04.891A: Status: ACTIVE | Noted: 2022-04-05

## 2022-04-05 PROBLEM — S04.891A: Chronic | Status: ACTIVE | Noted: 2022-04-05

## 2022-04-05 PROCEDURE — 99204 OFFICE O/P NEW MOD 45 MIN: CPT | Performed by: PSYCHIATRY & NEUROLOGY

## 2022-04-05 RX ORDER — INSULIN GLARGINE 100 [IU]/ML
INJECTION, SOLUTION SUBCUTANEOUS
COMMUNITY
Start: 2022-03-31 | End: 2022-04-07 | Stop reason: SDUPTHER

## 2022-04-05 RX ORDER — ROSUVASTATIN CALCIUM 10 MG/1
10 TABLET, COATED ORAL DAILY
COMMUNITY
Start: 2022-02-22

## 2022-04-05 NOTE — TELEPHONE ENCOUNTER
Spoke with patient.  She states she is picking one up now at pharmacy.  Will discuss eveline vs dexcom at appointment on 04/07/2022.

## 2022-04-05 NOTE — TELEPHONE ENCOUNTER
It looks like she has an appointment with Dr. Hamlin on Thursday 4/7.  If she has a sensor to last to her appt They could discuss this at her appt and he could give her a sample sensor from the office.  If she is out I would refill the freestyle eveline for now since that is what she is familiar with especially if her insurance has covered this.  She will have to be seen if she wants to try to get a Dexcom because it will not likely be covered without the proper documentation-- she has medicare.   I will send a 30 day freestyle eveline sensor prescription if she needs it-- just verify if she is on the eveline 2 or the 14 day eveline.  Thanks RT

## 2022-04-05 NOTE — TELEPHONE ENCOUNTER
PT WAS IN THE HOSPITAL AND THEY PUT HER ON THE FREESTYLE SHAYY SHE NEEDS REFILLS   SHE ALSO ASKED ABOUT THE DEXCOM  SHE WANTED WHICH EVER ONE HE RECOMMENDS    PT USES [facility] ON [address]     PLEASE CALL THE PT BACK [phone]

## 2022-04-05 NOTE — PROGRESS NOTES
"Chief Complaint  Tremors    Subjective          Idania Mccray presents to [facility]     History of Present Illness    66 y.o. female referred by Dr Ellen Nguyen for heat tremor.  Head developed tremor 5 years ago.  Pt does not notice tremor.  Family members are commenting.     Recent admission [facility] for DKA.  Noted to have tongue deviating to right.  MRI Brain - no acute infarct.     Head tilts to right.  Denies tremor in UE.      Reviewed medical records:    Pt with T2DM, HTN, hypothyroid    C/O tremor in head,     A1C 13.5, Na 132, TSH 5.5, Chol 212,     Objective   Vital Signs:   /62   Pulse 82   Temp 96.9 °F (36.1 °C)   Ht 157.5 cm (62.01\")   Wt 64.4 kg (142 lb)   SpO2 97%   BMI 25.97 kg/m²     BMI has not been calculated during today's encounter.       Physical Exam  Eyes:      Extraocular Movements: EOM normal.      Pupils: Pupils are equal, round, and reactive to light.   Neurological:      Mental Status: She is oriented to person, place, and time.      Gait: Gait is intact.      Deep Tendon Reflexes: Strength normal.   Psychiatric:         Speech: Speech normal.          Neurologic Exam     Mental Status   Oriented to person, place, and time.   Speech: speech is normal   Level of consciousness: alert  Knowledge: good and consistent with education.   Normal comprehension.     Cranial Nerves   Cranial nerves II through XII intact.     CN II   Visual fields full to confrontation.   Visual acuity: normal  Right visual field deficit: none  Left visual field deficit: none     CN III, IV, VI   Pupils are equal, round, and reactive to light.  Extraocular motions are normal.   Nystagmus: none   Diplopia: none  Ophthalmoparesis: none  Upgaze: normal  Downgaze: normal  Conjugate gaze: present    CN V   Facial sensation intact.   Right corneal reflex: normal  Left corneal reflex: normal    CN VII   Right facial weakness: none  Left facial weakness: none    CN VIII   Hearing: " intact    CN IX, X   Palate: symmetric  Right gag reflex: normal  Left gag reflex: normal    CN XI   Right sternocleidomastoid strength: normal    CN XII   Tongue: not atrophic  Fasciculations: absent  Tongue deviation: right  Head turned towards left shoulder, head tremors     Motor Exam   Muscle bulk: normal  Overall muscle tone: normal    Strength   Strength 5/5 throughout.     Sensory Exam   Right leg light touch: decreased from ankle  Left leg light touch: decreased from ankle  Right leg pinprick: decreased from ankle  Left leg pinprick: decreased from ankle    Gait, Coordination, and Reflexes     Gait  Gait: normal    Tremor   Resting tremor: absent  Intention tremor: absent  Action tremor: absent    Reflexes   Reflexes 2+ except as noted.      Result Review :   The following data was reviewed by: Gus Kimball MD on 04/05/2022:  Common labs    Common Labsle 6/29/21   Hemoglobin A1C 13.5                     Assessment and Plan    Diagnoses and all orders for this visit:    1. Cervical dystonia (Primary)  Assessment & Plan:  L head tilt and tremor     Start Botox 200 units.       2. Injury of right hypoglossal nerve, subsequent encounter  Assessment & Plan:  Secondary to DM with normal MRI Brain         Follow Up   No follow-ups on file.  Patient was given instructions and counseling regarding her condition or for health maintenance advice. Please see specific information pulled into the AVS if appropriate.

## 2022-04-07 ENCOUNTER — OFFICE VISIT (OUTPATIENT)
Dept: ENDOCRINOLOGY | Facility: CLINIC | Age: 67
End: 2022-04-07

## 2022-04-07 VITALS
WEIGHT: 141 LBS | OXYGEN SATURATION: 98 % | BODY MASS INDEX: 25.95 KG/M2 | DIASTOLIC BLOOD PRESSURE: 64 MMHG | HEIGHT: 62 IN | HEART RATE: 60 BPM | SYSTOLIC BLOOD PRESSURE: 108 MMHG

## 2022-04-07 DIAGNOSIS — E78.01 FAMILIAL HYPERCHOLESTEROLEMIA: ICD-10-CM

## 2022-04-07 DIAGNOSIS — E11.65 TYPE 2 DIABETES MELLITUS WITH HYPERGLYCEMIA, WITH LONG-TERM CURRENT USE OF INSULIN: Primary | ICD-10-CM

## 2022-04-07 DIAGNOSIS — Z79.4 TYPE 2 DIABETES MELLITUS WITH HYPERGLYCEMIA, WITH LONG-TERM CURRENT USE OF INSULIN: Primary | ICD-10-CM

## 2022-04-07 DIAGNOSIS — E11.65 UNCONTROLLED TYPE 2 DIABETES MELLITUS WITH HYPERGLYCEMIA: ICD-10-CM

## 2022-04-07 DIAGNOSIS — E03.9 ACQUIRED HYPOTHYROIDISM: ICD-10-CM

## 2022-04-07 DIAGNOSIS — Z79.4 INSULIN LONG-TERM USE: ICD-10-CM

## 2022-04-07 DIAGNOSIS — I10 PRIMARY HYPERTENSION: ICD-10-CM

## 2022-04-07 LAB
EXPIRATION DATE: ABNORMAL
GLUCOSE BLDC GLUCOMTR-MCNC: 317 MG/DL (ref 70–130)
HBA1C MFR BLD: 9.6 %
Lab: ABNORMAL

## 2022-04-07 PROCEDURE — 99214 OFFICE O/P EST MOD 30 MIN: CPT | Performed by: INTERNAL MEDICINE

## 2022-04-07 PROCEDURE — 95251 CONT GLUC MNTR ANALYSIS I&R: CPT | Performed by: INTERNAL MEDICINE

## 2022-04-07 RX ORDER — INSULIN GLARGINE 100 [IU]/ML
12 INJECTION, SOLUTION SUBCUTANEOUS DAILY
Qty: 15 ML | Refills: 3 | Status: SHIPPED | OUTPATIENT
Start: 2022-04-07 | End: 2022-10-11

## 2022-04-07 RX ORDER — FLASH GLUCOSE SENSOR
KIT MISCELLANEOUS
COMMUNITY
End: 2022-04-07 | Stop reason: SDUPTHER

## 2022-04-07 RX ORDER — FLASH GLUCOSE SENSOR
1 KIT MISCELLANEOUS
Qty: 2 EACH | Refills: 5 | Status: SHIPPED | OUTPATIENT
Start: 2022-04-07 | End: 2022-04-26

## 2022-04-07 RX ORDER — INSULIN ASPART 100 [IU]/ML
INJECTION, SOLUTION INTRAVENOUS; SUBCUTANEOUS
Qty: 15 ML | Refills: 3 | Status: SHIPPED | OUTPATIENT
Start: 2022-04-07 | End: 2022-10-11

## 2022-04-07 NOTE — PROGRESS NOTES
"     Office Note      Date: 2022  Patient Name: Idania Mccray  MRN: [id number]  : 1955    Chief Complaint   Patient presents with   • Diabetes       History of Present Illness:   Idania Mccray is a 66 y.o. female who presents for Diabetes type 2. Diagnosed in: 2017. Treated in past with oral agents. Current treatments: basal-bolus insulin. Number of insulin shots per day: 1. Checks blood sugar 288 times a day - on FreeStyle Latrice. Has low blood sugar: rare. Aspirin use: Yes. Statin use: Yes. ACE-I/ARB use: Yes. Changes in health since last visit: admitted to [facility] for pneumonia, sepsis, hyperglycemia, n/v, loss of consciousness. She says she was out of trulicity and lantus.  Last eye exam .    She remains on T4 75mcg qd.  She is taking this correctly.      It has been almost a year since her last visit here.    Subjective      Diabetic Complications:  Eyes: No  Kidneys: No  Feet: No  Heart: No    Diet and Exercise:  Meals per day: 3  Minutes of exercise per week: 0 mins.    Review of Systems:   Review of Systems   Constitutional: Negative.    Cardiovascular: Negative.    Gastrointestinal: Positive for nausea and vomiting.   Endocrine: Negative.        The following portions of the patient's history were reviewed and updated as appropriate: allergies, current medications, past family history, past medical history, past social history, past surgical history and problem list.    Objective       Visit Vitals  /64   Pulse 60   Ht 157.5 cm (62\")   Wt 64 kg (141 lb)   LMP  (LMP Unknown)   SpO2 98%   BMI 25.79 kg/m²       Physical Exam:  Physical Exam  Constitutional:       Appearance: Normal appearance.   Neurological:      Mental Status: She is alert.         Labs:    HbA1c  Lab Results   Component Value Date    HGBA1C 9.6 2022       CMP  Lab Results   Component Value Date    GLUCOSE 396 (H) 2019    BUN 14 2019    CREATININE 0.80 2019    EGFRIFNONA 72 2019    BCR " 17.5 08/22/2019    K 4.8 08/22/2019    CO2 26.0 08/22/2019    CALCIUM 9.9 08/22/2019    AST 17 08/22/2019    ALT 17 08/22/2019        Lipid Panel  Lab Results   Component Value Date    CHLPL 209 (H) 07/01/2015    HDL 60 12/07/2018     (H) 12/07/2018    TRIG 102 12/07/2018        TSH  Lab Results   Component Value Date    TSH 0.010 (L) 12/26/2017        Hemoglobin A1C  Lab Results   Component Value Date    HGBA1C 9.6 04/07/2022        Microalbumin/Creatinine  No results found for: MALBCRERATIO, CREATINIURIN, MICROALBUR        Assessment / Plan      Assessment & Plan:  Diagnoses and all orders for this visit:    1. Type 2 diabetes mellitus with hyperglycemia, with long-term current use of insulin (HCC) (Primary)  -     POC Glucose, Blood  -     Cancel: POC Glycosylated Hemoglobin (Hb A1C)    2. Uncontrolled type 2 diabetes mellitus with hyperglycemia (HCC)  Assessment & Plan:  Diabetes is improving with treatment. CGM shows lower early AM readings and higher postprandial spikes.    Continue current treatment regimen.  Decrease lantus.  Increase novolog.  Diabetes will be reassessed in 3 months.      3. Primary hypertension  Assessment & Plan:  Hypertension is unchanged.  Continue current treatment regimen.  Blood pressure will be reassessed at the next regular appointment.      4. Familial hypercholesterolemia  Assessment & Plan:  Recent LDL above goal.  Continue statin.        5. Acquired hypothyroidism  Assessment & Plan:  Continue T4 tx. Check TSH next visit.      6. Insulin long-term use (HCC)    Other orders  -     Lantus SoloStar 100 UNIT/ML injection pen; Inject 12 Units under the skin into the appropriate area as directed Daily.  Dispense: 15 mL; Refill: 3  -     insulin aspart (NovoLOG FlexPen) 100 UNIT/ML solution pen-injector sc pen; INJECT 7 UNITS UNDER THE SKIN BEFORE MEALS  Dispense: 15 mL; Refill: 3      Return in about 3 months (around 7/7/2022) for Recheck with A1c, CMP, TSH, lipids, foot  exam.    Hiro Hamlin MD   04/07/2022

## 2022-04-07 NOTE — ASSESSMENT & PLAN NOTE
Diabetes is improving with treatment. CGM shows lower early AM readings and higher postprandial spikes.    Continue current treatment regimen.  Decrease lantus.  Increase novolog.  Diabetes will be reassessed in 3 months.

## 2022-04-08 ENCOUNTER — TELEPHONE (OUTPATIENT)
Dept: ENDOCRINOLOGY | Facility: CLINIC | Age: 67
End: 2022-04-08

## 2022-04-08 NOTE — TELEPHONE ENCOUNTER
Patient called stated Dr. Hamlin lowered her dose for her Lantus but she couldn't remember what it was lowered to? Please advise

## 2022-04-11 ENCOUNTER — TELEPHONE (OUTPATIENT)
Dept: ENDOCRINOLOGY | Facility: CLINIC | Age: 67
End: 2022-04-11

## 2022-04-15 ENCOUNTER — TELEPHONE (OUTPATIENT)
Dept: ENDOCRINOLOGY | Facility: CLINIC | Age: 67
End: 2022-04-15

## 2022-04-15 NOTE — TELEPHONE ENCOUNTER
Pt called to say that her insurance would not cover the eveline  And we need to send a request for the cgm  To [facility]  Fax # [phone]    If you have questions call  [phone]

## 2022-04-15 NOTE — TELEPHONE ENCOUNTER
Spoke with patient.  She is going to check with her insurance to see about coverage for the Latrice 2 and the dexcom

## 2022-04-22 RX ORDER — PEN NEEDLE, DIABETIC 32GX 5/32"
NEEDLE, DISPOSABLE MISCELLANEOUS
Qty: 400 EACH | Refills: 3 | Status: SHIPPED | OUTPATIENT
Start: 2022-04-22

## 2022-04-25 NOTE — TELEPHONE ENCOUNTER
PATIENT SPOKE WITH HER INSURANCE PROVIDER AND WAS TOLD THAT THIS OFFICE WOULD NEED TO SUPPLY AN AUTHORIZATION FOR SHAYY SENSOR. SHE STATES THAT SHE HAS BEEN GOING TO [facility] TO GET REPLACEMENTS UP UNTIL THIS POINT.     CALL BACK [phone]

## 2022-04-25 NOTE — TELEPHONE ENCOUNTER
Called the pt and told her I did see the form was sent to [facility] on 4-11-22. She verbalized understanding.

## 2022-04-26 ENCOUNTER — TELEPHONE (OUTPATIENT)
Dept: ENDOCRINOLOGY | Facility: CLINIC | Age: 67
End: 2022-04-26

## 2022-04-26 NOTE — TELEPHONE ENCOUNTER
PT CALLED STATING [facility] IS NOT IN NETWORK W/ HER INSURANCE. SHE REQUESTED WE SEND RX FOR SHAYY SENSORS IN TO [facility] OR [facility].

## 2022-05-13 ENCOUNTER — TELEPHONE (OUTPATIENT)
Dept: NEUROLOGY | Facility: CLINIC | Age: 67
End: 2022-05-13

## 2022-05-13 NOTE — TELEPHONE ENCOUNTER
Provider: JEAN CLAUDE  Caller: PATIENT  Relationship to Patient: SELF  Pharmacy: N/A  Phone Number: [phone]  Reason for Call: PATIENT TELEPHONED TO SCHEDULE AN APPOINTMENT W/DR. SILVERIO TO DISCUSS BOTOX.    PLEASE CALL & ADVISE.    THANK YOU.

## 2022-05-17 ENCOUNTER — TELEPHONE (OUTPATIENT)
Dept: ENDOCRINOLOGY | Facility: CLINIC | Age: 67
End: 2022-05-17

## 2022-05-17 NOTE — TELEPHONE ENCOUNTER
She could increase the lantus by 10u until the steroids stop.  She could take an extra 5u of mealtime along with her usual dose if glucose >250 at mealtime.  Make sure to stay well hydrated.

## 2022-05-17 NOTE — TELEPHONE ENCOUNTER
"Patient returned Kaitlin's call. I relayed the message from Dr. Hamlin, \"She could increase the lantus by 10u until the steroids stop. She could take an extra 5u of mealtime along with her usual dose if glucose is >250 at mealtime. Make sure to stay well hydrated.\" She verbalized understanding.   "

## 2022-05-17 NOTE — TELEPHONE ENCOUNTER
PT CALLED STATING SHE STARTED TAKING STEROIDS FOR AN UPPER RESPIRATORY INFECTION. SHE REQUESTED A CALL BACK TO CONSULT.

## 2022-05-24 ENCOUNTER — OFFICE VISIT (OUTPATIENT)
Dept: NEUROLOGY | Facility: CLINIC | Age: 67
End: 2022-05-24

## 2022-05-24 ENCOUNTER — SPECIALTY PHARMACY (OUTPATIENT)
Dept: ONCOLOGY | Facility: HOSPITAL | Age: 67
End: 2022-05-24

## 2022-05-24 VITALS
TEMPERATURE: 97.1 F | WEIGHT: 145 LBS | BODY MASS INDEX: 26.68 KG/M2 | DIASTOLIC BLOOD PRESSURE: 70 MMHG | SYSTOLIC BLOOD PRESSURE: 112 MMHG | HEIGHT: 62 IN | HEART RATE: 79 BPM | OXYGEN SATURATION: 99 %

## 2022-05-24 DIAGNOSIS — G24.3 CERVICAL DYSTONIA: Primary | ICD-10-CM

## 2022-05-24 PROCEDURE — 64616 CHEMODENERV MUSC NECK DYSTON: CPT | Performed by: PSYCHIATRY & NEUROLOGY

## 2022-05-24 RX ORDER — DULAGLUTIDE 0.75 MG/.5ML
INJECTION, SOLUTION SUBCUTANEOUS
COMMUNITY
Start: 2022-05-20 | End: 2022-05-24

## 2022-05-24 NOTE — PROGRESS NOTES
Botulinum Toxin Injection Procedure         Pre-operative diagnosis: cervical dystonia    Post-operative diagnosis: Same    Procedure: Chemical neurolysis    After risks and benefits were explained including bleeding, infection, worsening of pain, damage to the areas being injected, weakness of muscles, loss of muscle control, dysphagia if injecting the head or neck, facial droop if injecting the facial area, painful injection, allergic or other reaction to the medications being injected, and the failure of the procedure to help the problem, a signed consent was obtained.      The patient was placed in a comfortable area and the sites to be treated were identified. A time out was called and performed. The area to be treated was prepped three times with alcohol and the alcohol allowed to dry. Next, a 27 gauge needle was used to inject the medication in the area to be treated.      Medications used: botulinum toxin 200 mu, 2 mL of saline used to dilute the botulinum toxin.      The patient did tolerate the procedure well. There were no complications.       Physical Examination    Current Treatment (Units)   Splenius Capitis 50 units on the right   SCM  150 units on the right      EMG Guidance none .   Complications: none .     The total amount injected in units is 200 .   The total amount wasted in units is 0  The total amount submitted in units is 200 .   Botox was supplied by physician.

## 2022-05-25 NOTE — PROGRESS NOTES
Specialty Pharmacy Refill Coordination Note     Idania is a 66 y.o. female contacted today regarding refills of Botox specialty medication(s).    Reviewed and verified with patient: Yes  Specialty medication(s) and dose(s) confirmed: yes        Delivery Questions    Flowsheet Row Most Recent Value   Delivery method [employer]  [Ship Botox to Dr. Kimball's office: [address]   Delivery address correct? Yes   Preferred delivery time? Anytime   Number of medications in delivery 1   Medication being filled and delivered Botox   Doses left of specialty medications None. Administered in provider's office.   Is there any medication that is due not being filled? No   Supplies needed? No supplies needed   Cooler needed? Yes   Do any medications need mixed or dated? No   Copay form of payment Credit card on file   Questions or concerns for the pharmacist? No   Are any medications first time fills? Yes  [Starting Botox.]                 Follow-up:  12 weeks.     Ashli Guillory, Pharmacy Technician  Specialty Pharmacy Technician

## 2022-05-26 RX ORDER — ESCITALOPRAM OXALATE 10 MG/1
TABLET ORAL
Qty: 90 TABLET | Refills: 0 | Status: SHIPPED | OUTPATIENT
Start: 2022-05-26

## 2022-05-27 RX ORDER — LEVOTHYROXINE SODIUM 0.07 MG/1
TABLET ORAL
Qty: 90 TABLET | Refills: 1 | Status: SHIPPED | OUTPATIENT
Start: 2022-05-27 | End: 2022-10-03

## 2022-06-30 ENCOUNTER — TELEPHONE (OUTPATIENT)
Dept: ENDOCRINOLOGY | Facility: CLINIC | Age: 67
End: 2022-06-30

## 2022-06-30 NOTE — TELEPHONE ENCOUNTER
Please call pt she just tested positive for covid and was told to call us to see if she needs to do anything special    pts number  [phone]

## 2022-06-30 NOTE — TELEPHONE ENCOUNTER
Tested positive yesterday with COVID.  She reports she is feeling better today, but not great.  I advised her to monitor her blood glucose readings and continue her current insulin regimen.  If her glucose readings her elevated she can add 1 units for every 50mg/dl her glucose is >150mg/dl.  I encouraged her to stay hydrated and go to the ED if she has any severe symptoms.    She will reschedule her appt next week as she will be quarantined.  RT

## 2022-08-10 ENCOUNTER — SPECIALTY PHARMACY (OUTPATIENT)
Dept: PHARMACY | Facility: HOSPITAL | Age: 67
End: 2022-08-10

## 2022-09-19 RX ORDER — LISINOPRIL 20 MG/1
TABLET ORAL
Qty: 45 TABLET | Refills: 0 | Status: SHIPPED | OUTPATIENT
Start: 2022-09-19 | End: 2022-10-03

## 2022-10-03 RX ORDER — LEVOTHYROXINE SODIUM 0.07 MG/1
TABLET ORAL
Qty: 90 TABLET | Refills: 0 | Status: SHIPPED | OUTPATIENT
Start: 2022-10-03 | End: 2022-10-13

## 2022-10-03 RX ORDER — LISINOPRIL 20 MG/1
TABLET ORAL
Qty: 45 TABLET | Refills: 0 | Status: SHIPPED | OUTPATIENT
Start: 2022-10-03 | End: 2022-10-11 | Stop reason: SDUPTHER

## 2022-10-11 ENCOUNTER — OFFICE VISIT (OUTPATIENT)
Dept: ENDOCRINOLOGY | Facility: CLINIC | Age: 67
End: 2022-10-11

## 2022-10-11 VITALS
DIASTOLIC BLOOD PRESSURE: 68 MMHG | BODY MASS INDEX: 26.5 KG/M2 | WEIGHT: 144 LBS | OXYGEN SATURATION: 98 % | HEART RATE: 78 BPM | SYSTOLIC BLOOD PRESSURE: 116 MMHG | HEIGHT: 62 IN

## 2022-10-11 DIAGNOSIS — E03.9 ACQUIRED HYPOTHYROIDISM: ICD-10-CM

## 2022-10-11 DIAGNOSIS — E78.01 FAMILIAL HYPERCHOLESTEROLEMIA: ICD-10-CM

## 2022-10-11 DIAGNOSIS — I10 PRIMARY HYPERTENSION: ICD-10-CM

## 2022-10-11 DIAGNOSIS — Z79.4 TYPE 2 DIABETES MELLITUS WITH HYPERGLYCEMIA, WITH LONG-TERM CURRENT USE OF INSULIN: Primary | ICD-10-CM

## 2022-10-11 DIAGNOSIS — E11.65 TYPE 2 DIABETES MELLITUS WITH HYPERGLYCEMIA, WITH LONG-TERM CURRENT USE OF INSULIN: Primary | ICD-10-CM

## 2022-10-11 LAB
EXPIRATION DATE: ABNORMAL
EXPIRATION DATE: NORMAL
GLUCOSE BLDC GLUCOMTR-MCNC: 450 MG/DL (ref 70–130)
HBA1C MFR BLD: 12 %
Lab: ABNORMAL
Lab: NORMAL

## 2022-10-11 PROCEDURE — 83036 HEMOGLOBIN GLYCOSYLATED A1C: CPT | Performed by: PHYSICIAN ASSISTANT

## 2022-10-11 PROCEDURE — 99214 OFFICE O/P EST MOD 30 MIN: CPT | Performed by: PHYSICIAN ASSISTANT

## 2022-10-11 PROCEDURE — 3046F HEMOGLOBIN A1C LEVEL >9.0%: CPT | Performed by: PHYSICIAN ASSISTANT

## 2022-10-11 PROCEDURE — 95251 CONT GLUC MNTR ANALYSIS I&R: CPT | Performed by: PHYSICIAN ASSISTANT

## 2022-10-11 RX ORDER — INSULIN GLARGINE 100 [IU]/ML
16 INJECTION, SOLUTION SUBCUTANEOUS DAILY
Qty: 45 ML | Refills: 1 | Status: SHIPPED | OUTPATIENT
Start: 2022-10-11 | End: 2023-02-16 | Stop reason: SDUPTHER

## 2022-10-11 RX ORDER — LISINOPRIL 20 MG/1
10 TABLET ORAL DAILY
Qty: 45 TABLET | Refills: 1 | Status: SHIPPED | OUTPATIENT
Start: 2022-10-11

## 2022-10-11 RX ORDER — INSULIN ASPART 100 [IU]/ML
INJECTION, SOLUTION INTRAVENOUS; SUBCUTANEOUS
Qty: 45 ML | Refills: 1 | Status: SHIPPED | OUTPATIENT
Start: 2022-10-11 | End: 2023-02-16 | Stop reason: SDUPTHER

## 2022-10-11 NOTE — PROGRESS NOTES
"     Office Note      Date: 10/11/2022  Patient Name: Idania Mccray  MRN: [id number]  : 1955    Chief Complaint   Patient presents with   • Diabetes       History of Present Illness:   Idania Mccray is a 67 y.o. female who presents for follow-up for type 2 diabetes diagnosed in .  It has been 6 months since her last appointment.  She reports she has been taking her Lantus regularly 12 units at 11 AM daily and NovoLog 8 to 9 units 3 times a day with meals.  She reports she has been wearing her freestyle eveline the last few weeks and noted her blood sugars were elevated so she was taking slightly more NovoLog 9 units regularly with meals.  She denies any trouble with hypoglycemia.  She reports she did eat lunch today and forgot to bring her Humalog out with her so she did not take insulin with lunch.  Reports she was diagnosed with COVID in August and had an per respiratory infection later in the month and had to be on steroids.  She reports her blood sugars during this time were elevated.  She reports she has an eye exam scheduled for tomorrow.  She denies any trouble with her feet.  She is not fasting for labs today because she had to reschedule her appointment several times and got this afternoon appointment.  She remains on levothyroxine 75 mcg daily.  She reports she is taking this regularly and correctly.      Subjective     Review of Systems:   Review of Systems   Constitutional: Negative.    Cardiovascular: Negative.    Gastrointestinal: Negative.    Endocrine: Negative.    Neurological: Negative.        The following portions of the patient's history were reviewed and updated as appropriate: allergies, current medications, past family history, past medical history, past social history, past surgical history and problem list.    Objective     Vitals:    10/11/22 1445   BP: 116/68   Pulse: 78   SpO2: 98%   Weight: 65.3 kg (144 lb)   Height: 157.5 cm (62\")     Body mass index is 26.34 " kg/m².    Physical Exam  Vitals reviewed.   Constitutional:       General: She is not in acute distress.     Appearance: Normal appearance.   Cardiovascular:      Pulses:           Dorsalis pedis pulses are 2+ on the right side and 2+ on the left side.        Posterior tibial pulses are 2+ on the right side and 2+ on the left side.   Musculoskeletal:      Right foot: No deformity.      Left foot: No deformity.   Feet:      Right foot:      Protective Sensation: 5 sites tested. 5 sites sensed.      Skin integrity: Skin integrity normal.      Toenail Condition: Right toenails are normal.      Left foot:      Protective Sensation: 5 sites tested. 5 sites sensed.      Skin integrity: Skin integrity normal.      Toenail Condition: Left toenails are normal.      Comments: Diabetic Foot Exam Performed and Monofilament Test Performed    Neurological:      Mental Status: She is alert.         HEMOGLOBIN A1C  Lab Results   Component Value Date    HGBA1C 12.0 10/11/2022       GLUCOSE  Glucose   Date Value Ref Range Status   10/11/2022 450 (A) 70 - 130 mg/dL Final     Comment:     [facility]       CMP  Lab Results   Component Value Date    GLUCOSE 396 (H) 08/22/2019    BUN 14 08/22/2019    CREATININE 0.80 08/22/2019    EGFRIFNONA 72 08/22/2019    BCR 17.5 08/22/2019    K 4.8 08/22/2019    CO2 26.0 08/22/2019    CALCIUM 9.9 08/22/2019    AST 17 08/22/2019    ALT 17 08/22/2019       LIPID PANEL  Lab Results   Component Value Date    CHOL 224 (H) 12/07/2018    CHLPL 209 (H) 07/01/2015    TRIG 102 12/07/2018    HDL 60 12/07/2018     (H) 12/07/2018       URINE MICROALBUMIN/CREATININE RATIO  No results found for: MALBCUMUJOHNYCRISTINA    TSH  Lab Results   Component Value Date    TSH 0.010 (L) 12/26/2017       Assessment / Plan      Assessment & Plan:  1. Type 2 diabetes mellitus with hyperglycemia, with long-term current use of insulin ([facility])  Hemoglobin A1c is higher than it was in April and well above goal at 12.0%.  Her glucose this  afternoon is 450 mg/dL.  She forgot to take her insulin with lunch today.  I reviewed her freestyle eveline download and her average blood glucose is 317 mg/dL.  78% of her blood glucose readings have been very high above 250 mg/dL 13% high and 9% within the target range.  We discussed the importance of getting her diabetes under better control.  She will increase her Lantus to 16 units daily and her NovoLog to 10 units plus additional insulin if her blood glucose is over 250 we will add 1 unit for every 50 mg/dL.  She will take 12 units of her NovoLog tonight with her supper.  I gave her instructions for titrating the Lantus every 3 days based on her fasting blood sugar.  She will increase her to this insulin 2 units every 3 days until her fasting blood sugar readings are between 90 and 150 mg/dL.  Courage her to reach out to me with blood sugar readings if these remain elevated.  Her blood pressure is okay today.  Weight is up 4 pounds since her appointment in April.  Her foot exam is okay today.  I refilled her insulins today.  - POC Glucose, Blood  - POC Glycosylated Hemoglobin (Hb A1C)    2. Acquired hypothyroidism  TFTs pending.  Will send note with results and plan.  For now she will continue levothyroxine 75 mcg daily.  I will refill this prescription once her labs have been reviewed.  - T4, Free  - TSH    3. Primary hypertension  Her blood pressure is okay today.  She will continue her lisinopril 10 mg daily.  I refilled this prescription today.    4. Familial hypercholesterolemia  She is due for fasting labs.  We will plan to check these next visit for monitoring.  For now she will continue Crestor 10 mg daily.      Return in about 3 months (around 1/11/2023) for Recheck, fasting, sees me or Dr. Hamlin.     Brandi Rocha PA-C  10/11/2022

## 2022-10-12 LAB
T4 FREE SERPL-MCNC: 1.15 NG/DL (ref 0.93–1.7)
TSH SERPL DL<=0.005 MIU/L-ACNC: 5.62 UIU/ML (ref 0.27–4.2)

## 2022-10-13 RX ORDER — LEVOTHYROXINE SODIUM 88 UG/1
88 TABLET ORAL DAILY
Qty: 90 TABLET | Refills: 1 | Status: SHIPPED | OUTPATIENT
Start: 2022-10-13

## 2023-02-16 RX ORDER — INSULIN ASPART 100 [IU]/ML
INJECTION, SOLUTION INTRAVENOUS; SUBCUTANEOUS
Qty: 45 ML | Refills: 1 | Status: SHIPPED | OUTPATIENT
Start: 2023-02-16

## 2023-02-16 RX ORDER — INSULIN GLARGINE 100 [IU]/ML
16 INJECTION, SOLUTION SUBCUTANEOUS DAILY
Qty: 45 ML | Refills: 1 | Status: SHIPPED | OUTPATIENT
Start: 2023-02-16

## 2023-02-16 NOTE — TELEPHONE ENCOUNTER
Refill to [facility].  Refill Novolog Flex Pen MDD 50units and Lantus Solostar MDD 20 units. Last office visit 10/11/22.  Next scheduled appt 03/09/23

## 2023-02-24 RX ORDER — LEVOTHYROXINE SODIUM 0.07 MG/1
TABLET ORAL
Qty: 90 TABLET | Refills: 0 | OUTPATIENT
Start: 2023-02-24

## 2023-04-21 ENCOUNTER — TELEPHONE (OUTPATIENT)
Dept: ENDOCRINOLOGY | Facility: CLINIC | Age: 68
End: 2023-04-21
Payer: MEDICARE

## 2023-05-19 ENCOUNTER — TELEPHONE (OUTPATIENT)
Dept: ENDOCRINOLOGY | Facility: CLINIC | Age: 68
End: 2023-05-19
Payer: MEDICARE

## 2023-05-19 NOTE — TELEPHONE ENCOUNTER
[facility] called, wanted to know if we had received forms for this patient. They would like a call back [phone] opt. [phone]. Thank you.

## 2023-05-22 ENCOUNTER — TELEPHONE (OUTPATIENT)
Dept: ENDOCRINOLOGY | Facility: CLINIC | Age: 68
End: 2023-05-22
Payer: MEDICARE

## 2023-05-22 NOTE — TELEPHONE ENCOUNTER
Patient called the office, stated that she is needing more of her eveline sensors. Stated that she gets these through [facility], but when she spoke to them on Friday they stated that they were waiting on forms from us. She wanted to know if we had received or sent them. She would like a call back. Thank you!

## 2023-05-25 ENCOUNTER — OFFICE VISIT (OUTPATIENT)
Dept: ENDOCRINOLOGY | Facility: CLINIC | Age: 68
End: 2023-05-25
Payer: MEDICARE

## 2023-05-25 VITALS
BODY MASS INDEX: 30.18 KG/M2 | HEIGHT: 62 IN | HEART RATE: 80 BPM | DIASTOLIC BLOOD PRESSURE: 68 MMHG | OXYGEN SATURATION: 97 % | SYSTOLIC BLOOD PRESSURE: 110 MMHG | WEIGHT: 164 LBS

## 2023-05-25 DIAGNOSIS — Z79.4 TYPE 2 DIABETES MELLITUS WITH HYPERGLYCEMIA, WITH LONG-TERM CURRENT USE OF INSULIN: Primary | ICD-10-CM

## 2023-05-25 DIAGNOSIS — E03.9 ACQUIRED HYPOTHYROIDISM: ICD-10-CM

## 2023-05-25 DIAGNOSIS — E11.65 TYPE 2 DIABETES MELLITUS WITH HYPERGLYCEMIA, WITH LONG-TERM CURRENT USE OF INSULIN: Primary | ICD-10-CM

## 2023-05-25 LAB
EXPIRATION DATE: ABNORMAL
EXPIRATION DATE: NORMAL
GLUCOSE BLDC GLUCOMTR-MCNC: 214 MG/DL (ref 70–130)
HBA1C MFR BLD: 9.8 %
Lab: ABNORMAL
Lab: NORMAL

## 2023-05-25 RX ORDER — DULAGLUTIDE 0.75 MG/.5ML
0.75 INJECTION, SOLUTION SUBCUTANEOUS
Qty: 6 ML | Refills: 1 | Status: SHIPPED | OUTPATIENT
Start: 2023-05-25

## 2023-05-25 RX ORDER — LISINOPRIL 20 MG/1
10 TABLET ORAL DAILY
Qty: 45 TABLET | Refills: 1 | Status: SHIPPED | OUTPATIENT
Start: 2023-05-25

## 2023-05-25 NOTE — PROGRESS NOTES
"     Office Note      Date: 2023  Patient Name: Idania Mccray  MRN: [id number]  : 1955    Chief Complaint   Patient presents with   • Diabetes       History of Present Illness:   Idania Mccray is a 67 y.o. female who presents for follow-up for type 2 diabetes diagnosed in .  It has been 7 months since her last appointment.  She is glad to hear her hemoglobin A1c has improved but she is frustrated with her weight gain.  She reports she was previously on Trulicity and this seemed to help prevent the weight gain but she had to go off of this due to insurance.  She has been on metformin in the past but this did not work well for her to control her blood sugars.  She is currently taking Lantus 16 units daily and NovoLog 11 units with meals.  Continues to wear the freestyle eveline continuous glucose monitor.  She denies severe or frequent hypoglycemia.  She is up-to-date on her eye exam she had her appointment in October.  Denies any trouble with her feet today.  We did her foot exam at her appointment in October.  She reports she has fasting labs coming up with her primary care physician at her physical next month.  We increased her levothyroxine dose to her appointment in October to 88 mcg daily.  She reports she has been taking this regularly and correctly.    Subjective     Review of Systems:   Review of Systems   Constitutional: Positive for unexpected weight change.   Cardiovascular: Negative.    Gastrointestinal: Negative.    Endocrine: Negative.    Neurological: Negative.        The following portions of the patient's history were reviewed and updated as appropriate: allergies, current medications, past family history, past medical history, past social history, past surgical history and problem list.    Objective     Vitals:    23 1526   BP: 110/68   Pulse: 80   SpO2: 97%   Weight: 74.4 kg (164 lb)   Height: 157.5 cm (62\")     Body mass index is 30 kg/m².    Physical Exam  Vitals " reviewed.   Constitutional:       General: She is not in acute distress.     Appearance: Normal appearance.   Neurological:      Mental Status: She is alert.         HEMOGLOBIN A1C  Lab Results   Component Value Date    HGBA1C 9.8 05/25/2023       GLUCOSE  Glucose   Date Value Ref Range Status   05/25/2023 214 (A) 70 - 130 mg/dL Final         TSH  Lab Results   Component Value Date    TSH 5.620 (H) 10/11/2022       Assessment / Plan      Assessment & Plan:  1. Type 2 diabetes mellitus with hyperglycemia, with long-term current use of insulin  Her hemoglobin A1c today is above goal but has improved as compared to October at 9.8%.  This is still well above goal.  I reviewed her freestyle eveline download and her blood sugars tend to spike in the morning but do come down throughout the day.  She has had a few low readings overnight but this does not occur regularly.  For now we will continue the Lantus 16 units daily.  We will add Trulicity 0.75 mg weekly to see if we can help promote weight loss.  When she starts this dose she will continue 11 units of NovoLog with breakfast but reduce her lunch and supper doses to 8 units.  She will send in blood sugars for review and adjustment as needed.  Her weight is up 20 pounds since her appointment in October.  I encouraged healthy eating habits and physical activity as tolerated.   Her blood pressure is okay today.  - POC Glucose, Blood  - POC Glycosylated Hemoglobin (Hb A1C)    2. Acquired hypothyroidism  TFTs pending today.  Will send note with results and plan.  For now she will continue the levothyroxine 88 mcg daily.  I will refill this prescription once her labs have been reviewed.  - T4, Free; Future  - TSH; Future  - TSH  - T4, Free      Return in about 3 months (around 8/25/2023) for Recheck 3-4 mos.     This note was dictated using Dragon voice recognition.    Brandi Rocha PA-C  05/25/2023

## 2023-05-25 NOTE — PATIENT INSTRUCTIONS
Add Trulicity 0.75mg once a week  When you start Trulicity reduce novolog to 8 units with lunch and supper, continue 11 units with breakfast and Lantus 16 units

## 2023-05-26 LAB
T4 FREE SERPL-MCNC: 0.76 NG/DL (ref 0.93–1.7)
TSH SERPL DL<=0.005 MIU/L-ACNC: 6.61 UIU/ML (ref 0.27–4.2)

## 2023-05-29 ENCOUNTER — PATIENT MESSAGE (OUTPATIENT)
Dept: ENDOCRINOLOGY | Facility: CLINIC | Age: 68
End: 2023-05-29

## 2023-05-29 RX ORDER — LEVOTHYROXINE SODIUM 0.1 MG/1
100 TABLET ORAL DAILY
Qty: 90 TABLET | Refills: 1 | Status: SHIPPED | OUTPATIENT
Start: 2023-05-29

## 2023-06-05 ENCOUNTER — TELEPHONE (OUTPATIENT)
Dept: ENDOCRINOLOGY | Facility: CLINIC | Age: 68
End: 2023-06-05
Payer: MEDICARE

## 2023-06-05 NOTE — TELEPHONE ENCOUNTER
Spoke with patient in regards to dosage change.  Patient understood dosage change of Levothyroxine.

## 2023-06-06 ENCOUNTER — TELEPHONE (OUTPATIENT)
Dept: ENDOCRINOLOGY | Facility: CLINIC | Age: 68
End: 2023-06-06
Payer: MEDICARE

## 2023-06-14 RX ORDER — DULAGLUTIDE 0.75 MG/.5ML
0.75 INJECTION, SOLUTION SUBCUTANEOUS
Qty: 6 ML | Refills: 0 | Status: SHIPPED | OUTPATIENT
Start: 2023-06-14 | End: 2023-06-19

## 2023-06-14 NOTE — TELEPHONE ENCOUNTER
Caller: MccrayCourtneyIdania L    Relationship: Self    Best call back number: [phone]    Requested Prescriptions:   SHAYY SENSORS   Pharmacy where request should be sent:  [facility]     Last office visit with prescribing clinician: 5/25/2023   Last telemedicine visit with prescribing clinician: Visit date not found   Next office visit with prescribing clinician: 8/29/2023     Additional details provided by patient: NEED PROGRESS NOTES SENT TO [facility] TO HAVE SENSORS APPROVED FOR THE SHAYY.     Does the patient have less than a 3 day supply:  [x] Yes  [] No    Would you like a call back once the refill request has been completed: [x] Yes [] No    If the office needs to give you a call back, can they leave a voicemail: [x] Yes [] No    Ginger Childers Rep   06/14/23 11:31 EDT

## 2023-06-14 NOTE — TELEPHONE ENCOUNTER
Caller: Idania Mccray    Relationship: Self    Best call back number: [phone]    Requested Prescriptions: TRULICITY 0.75 MG EVERY 7 DAYS   Requested Prescriptions      No prescriptions requested or ordered in this encounter        Pharmacy where request should be sent:  [facility]     Last office visit with prescribing clinician: 5/25/2023   Last telemedicine visit with prescribing clinician: Visit date not found   Next office visit with prescribing clinician: 8/29/2023     Additional details provided by patient: PT IS HAVING TROUBLE GETTING IT FROM [facility] THEY WERE OUT ,BUT [facility] IT . [facility]'S FAX # [phone]    Does the patient have less than a 3 day supply:  [x] Yes  [] No    Would you like a call back once the refill request has been completed: [x] Yes [] No    If the office needs to give you a call back, can they leave a voicemail: [x] Yes [] No    Ginger Olmedo   06/14/23 11:49 EDT

## 2023-06-16 ENCOUNTER — TELEPHONE (OUTPATIENT)
Dept: ENDOCRINOLOGY | Facility: CLINIC | Age: 68
End: 2023-06-16
Payer: MEDICARE

## 2023-06-16 NOTE — TELEPHONE ENCOUNTER
PT CALLED STATING TRULICITY IS TOO EXPENSIVE AND REQUESTED WE LOOK INTO THIS AND REACH OUT TO HER.

## 2023-06-16 NOTE — TELEPHONE ENCOUNTER
PATIENT SPOKE WITH HER INSURANCE CARRIER AND WAS PROVIDED THE LIST BELOW OF MEDS THAT WILL BE COVERED BY HER PLAN:    ALB[place]IA VENTURA     PLEASE LET PATIENT KNOW WHICH ONE WILL BE ORDERED.

## 2023-06-19 ENCOUNTER — TELEPHONE (OUTPATIENT)
Dept: ENDOCRINOLOGY | Facility: CLINIC | Age: 68
End: 2023-06-19
Payer: MEDICARE

## 2023-06-19 RX ORDER — INSULIN GLARGINE AND LIXISENATIDE 100; 33 U/ML; UG/ML
15 INJECTION, SOLUTION SUBCUTANEOUS DAILY
Qty: 6 ML | Refills: 4 | Status: SHIPPED | OUTPATIENT
Start: 2023-06-19 | End: 2023-06-19 | Stop reason: SDUPTHER

## 2023-06-19 RX ORDER — INSULIN GLARGINE AND LIXISENATIDE 100; 33 U/ML; UG/ML
15 INJECTION, SOLUTION SUBCUTANEOUS DAILY
Qty: 6 ML | Refills: 4 | Status: SHIPPED | OUTPATIENT
Start: 2023-06-19

## 2023-06-19 NOTE — TELEPHONE ENCOUNTER
Let's try Soliqua 15 units daily to replace Trulicity and Lantus- she will need to stop both meds when she starts Soliqua.  She should send in blood glucose readings for review and adjustment.  She will continue the novolog with meals as discussed at last visit --continue 11 units with supper but reduce Novolog to 8 units with lunch and supper cruzito she starts the Soliqua.  Thanks RT

## 2023-06-19 NOTE — TELEPHONE ENCOUNTER
Pt wanted to know if we can fax her records to [facility] they are waiting for our notes she is out of her eveline sensors  She also say the trulicity is 700.00 dollars can she try something else cheaper she uses [id number]     Please call the pt back  [phone]

## 2023-07-24 RX ORDER — LEVOTHYROXINE SODIUM 0.07 MG/1
TABLET ORAL
Qty: 90 TABLET | Refills: 0 | OUTPATIENT
Start: 2023-07-24

## 2023-07-26 NOTE — TELEPHONE ENCOUNTER
Rx Refill Note  Requested Prescriptions     Pending Prescriptions Disp Refills    NovoLOG FlexPen 100 UNIT/ML solution pen-injector sc pen [Pharmacy Med Name: NOVOLOG FLEXPEN 100 UNIT/ML Solution Pen-injector] 45 mL 1     Sig: INJECT 10-14 UNITS UNDER THE SKIN BEFORE MEALS          Last office visit with prescribing clinician: 5/25/2023     Next office visit with prescribing clinician: 8/29/2023         Latasha Pham MA  07/26/23, 12:18 EDT

## 2023-07-27 RX ORDER — INSULIN ASPART 100 [IU]/ML
INJECTION, SOLUTION INTRAVENOUS; SUBCUTANEOUS
Qty: 45 ML | Refills: 1 | Status: SHIPPED | OUTPATIENT
Start: 2023-07-27

## 2023-07-28 RX ORDER — PEN NEEDLE, DIABETIC 32GX 5/32"
NEEDLE, DISPOSABLE MISCELLANEOUS
Qty: 400 EACH | Refills: 3 | Status: SHIPPED | OUTPATIENT
Start: 2023-07-28

## 2023-07-28 NOTE — TELEPHONE ENCOUNTER
Rx Refill Note  Requested Prescriptions     Pending Prescriptions Disp Refills    BD Pen Needle Garrett 2nd Gen 32G X 4 MM misc [Pharmacy Med Name: BD GARRETT 2 GEN PEN NDL 32G 4MM] 400 each 3     Sig: USES 4 PER DAY AS DIRECTED      Last office visit with prescribing clinician: Visit date not found   Last telemedicine visit with prescribing clinician: 08/29/2023   Next office visit with prescribing clinician: Visit date not found                         Would you like a call back once the refill request has been completed: [] Yes [] No    If the office needs to give you a call back, can they leave a voicemail: [] Yes [] No    Roseanne Ocampo CMA  07/28/23, 15:05 EDT

## 2023-08-29 ENCOUNTER — OFFICE VISIT (OUTPATIENT)
Dept: ENDOCRINOLOGY | Facility: CLINIC | Age: 68
End: 2023-08-29
Payer: MEDICARE

## 2023-08-29 VITALS
WEIGHT: 174 LBS | OXYGEN SATURATION: 98 % | HEART RATE: 63 BPM | DIASTOLIC BLOOD PRESSURE: 72 MMHG | BODY MASS INDEX: 32.02 KG/M2 | HEIGHT: 62 IN | SYSTOLIC BLOOD PRESSURE: 120 MMHG

## 2023-08-29 DIAGNOSIS — E11.65 TYPE 2 DIABETES MELLITUS WITH HYPERGLYCEMIA, WITH LONG-TERM CURRENT USE OF INSULIN: Primary | ICD-10-CM

## 2023-08-29 DIAGNOSIS — Z79.4 TYPE 2 DIABETES MELLITUS WITH HYPERGLYCEMIA, WITH LONG-TERM CURRENT USE OF INSULIN: Primary | ICD-10-CM

## 2023-08-29 DIAGNOSIS — E03.9 ACQUIRED HYPOTHYROIDISM: ICD-10-CM

## 2023-08-29 DIAGNOSIS — I10 PRIMARY HYPERTENSION: ICD-10-CM

## 2023-08-29 LAB
EXPIRATION DATE: ABNORMAL
EXPIRATION DATE: NORMAL
GLUCOSE BLDC GLUCOMTR-MCNC: 134 MG/DL (ref 70–130)
HBA1C MFR BLD: 11.5 %
Lab: ABNORMAL
Lab: NORMAL

## 2023-08-29 RX ORDER — INSULIN GLARGINE AND LIXISENATIDE 100; 33 U/ML; UG/ML
17 INJECTION, SOLUTION SUBCUTANEOUS DAILY
Qty: 12 ML | Refills: 5 | Status: SHIPPED | OUTPATIENT
Start: 2023-08-29

## 2023-08-29 RX ORDER — LEVOTHYROXINE SODIUM 0.1 MG/1
100 TABLET ORAL DAILY
Qty: 90 TABLET | Refills: 1 | Status: SHIPPED | OUTPATIENT
Start: 2023-08-29

## 2023-08-29 NOTE — PROGRESS NOTES
Office Note      Date: 2023  Patient Name: Idania Mccray  MRN: [id number]  : 1955    Chief Complaint   Patient presents with    Diabetes       History of Present Illness:   Idania Mccray is a 67 y.o. female who presents for follow-up for type 2 diabetes diagnosed in .  We tried adding Trulicity at her appointment in May but this was not covered by insurance so we stopped her Lantus and added Soliqua 15 units daily.  She reports she has been taking this regularly since the end of  with no major issues.  She reports initially she noted she did not have as much of an appetite on this medication but that has not been the case recently.  She remains on NovoLog 11 units 3 times a day with meals.  She has been on metformin in the past but did not have luck with this controlling her blood glucose readings.  She is up-to-date on her eye exam she reports she had an appointment in February.  She reports she has been off her levothyroxine for about 3 weeks and just picked this up from the pharmacy yesterday.  She reports they were having trouble getting the medication.  She is currently taking levothyroxine 100 mcg daily.  We increased this dose last visit due to elevated TSH.  She denies any trouble with her feet today.  She reports she had a physical with her primary care physician but does not think they did any lab work.  She was in the emergency department with knee pain earlier this month and was diagnosed with a knee sprain/strain    Subjective     Review of Systems:   Review of Systems   Constitutional: Negative.    Cardiovascular: Negative.    Gastrointestinal: Negative.    Endocrine: Negative.    Neurological: Negative.      The following portions of the patient's history were reviewed and updated as appropriate: allergies, current medications, past family history, past medical history, past social history, past surgical history, and problem list.    Objective     Vitals:    23  "1301   BP: 120/72   Pulse: 63   SpO2: 98%   Weight: 78.9 kg (174 lb)   Height: 157.5 cm (62\")     Body mass index is 31.83 kg/mý.    Physical Exam  Vitals reviewed.   Constitutional:       General: She is not in acute distress.     Appearance: Normal appearance.   Cardiovascular:      Pulses:           Dorsalis pedis pulses are 2+ on the right side and 2+ on the left side.        Posterior tibial pulses are 2+ on the right side and 2+ on the left side.   Musculoskeletal:      Right foot: No deformity.      Left foot: No deformity.   Feet:      Right foot:      Protective Sensation: 5 sites tested.  5 sites sensed.      Skin integrity: Skin integrity normal.      Toenail Condition: Right toenails are normal.      Left foot:      Protective Sensation: 5 sites tested.  5 sites sensed.      Skin integrity: Skin integrity normal.      Toenail Condition: Left toenails are normal.      Comments: Diabetic Foot Exam Performed and Monofilament Test Performed      Neurological:      Mental Status: She is alert.       HEMOGLOBIN A1C  Lab Results   Component Value Date    HGBA1C 11.5 08/29/2023       GLUCOSE  Glucose   Date Value Ref Range Status   08/29/2023 134 (A) 70 - 130 mg/dL Final           TSH  Lab Results   Component Value Date    TSH 6.610 (H) 05/25/2023       Assessment / Plan      Assessment & Plan:  1. Type 2 diabetes mellitus with hyperglycemia, with long-term current use of insulin  Her hemoglobin A1c is well above goal at 11.5%.  I reviewed her freestyle eveline download today and overall her blood sugars have been elevated with 22% within target range and 78% high or very high with 0% low.  We will titrate her Soliqua to try to improve her blood glucose readings.  She will increase her dose to 17 units tomorrow and titrate this 2 units every week until fasting glucose is less than 150 mg/dL.  She will continue NovoLog 11 units with breakfast and supper and reduce her lunch dose down to 10 units.  I sent a new " prescription for the Soliqua today.  Her weight is up 10 pounds since her appointment in May.  I am hopeful that increasing the Soliqua may help promote weight loss.  I encouraged healthy eating habits and physical activity as tolerated.  Her foot exam was okay today.  We will plan to do a urine microalbumin/creatinine ratio and CMP next visit for monitoring.  - POC Glycosylated Hemoglobin (Hb A1C)  - POC Glucose, Blood    2. Primary hypertension  Her blood pressure is okay today.  She will continue her current medications.    3. Acquired hypothyroidism  We increased her levothyroxine dose last visit but she has not been taking this regularly for the last 3 weeks.  We will plan to check TFTs next visit for monitoring.  For now she will continue levothyroxine 100 mcg daily.  I refilled this prescription today.      Return in about 3 months (around 11/29/2023) for Recheck 3-4 mos.     This note was dictated using Dragon voice recognition.    Brandi Rocha PA-C  08/29/2023

## 2023-08-29 NOTE — PATIENT INSTRUCTIONS
Soliqua-- increase to 17 units daily--monitor fasting blood glucose is this remains >150mg/dl increase soliqua 2 units every week  Reduce novolog with lunch to 10 units, continue 11 units with breakfast and supper

## 2023-11-21 DIAGNOSIS — E03.9 HYPOTHYROIDISM, UNSPECIFIED: ICD-10-CM

## 2023-11-21 RX ORDER — LEVOTHYROXINE SODIUM 0.07 MG/1
75 TABLET ORAL DAILY
Qty: 90 TABLET | OUTPATIENT
Start: 2023-11-21

## 2023-11-21 NOTE — TELEPHONE ENCOUNTER
Rx Refill Note  Requested Prescriptions     Pending Prescriptions Disp Refills    levothyroxine (SYNTHROID, LEVOTHROID) 75 MCG tablet [Pharmacy Med Name: LEVOTHYROXINE 75 MCG TABLET] 90 tablet      Sig: TAKE 1 TABLET BY MOUTH EVERY DAY      Last office visit with prescribing clinician: 8/29/2023     Next office visit with prescribing clinician: 1/16/2024       Patricia Spear MA  11/21/23, 08:35 EST

## 2023-12-05 RX ORDER — LISINOPRIL 20 MG/1
10 TABLET ORAL DAILY
Qty: 45 TABLET | Refills: 0 | Status: SHIPPED | OUTPATIENT
Start: 2023-12-05

## 2023-12-05 NOTE — TELEPHONE ENCOUNTER
Rx Refill Note  Requested Prescriptions     Pending Prescriptions Disp Refills    lisinopril (PRINIVIL,ZESTRIL) 20 MG tablet [Pharmacy Med Name: LISINOPRIL 20 MG TABLET] 45 tablet 1     Sig: TAKE 1/2 TABLET BY MOUTH EVERY DAY      Last office visit with prescribing clinician: 8/29/2023     Next office visit with prescribing clinician: 1/16/2024       Patricia Spear MA  12/05/23, 15:48 EST

## 2024-04-09 ENCOUNTER — TELEPHONE (OUTPATIENT)
Dept: ENDOCRINOLOGY | Facility: CLINIC | Age: 69
End: 2024-04-09

## 2024-11-26 ENCOUNTER — HOSPITAL ENCOUNTER (OUTPATIENT)
Dept: CARDIOLOGY | Facility: HOSPITAL | Age: 69
Discharge: HOME | End: 2024-11-26
Payer: MEDICARE

## 2024-11-26 ENCOUNTER — TRANSCRIBE ORDERS (OUTPATIENT)
Dept: ADMINISTRATIVE | Facility: HOSPITAL | Age: 69
End: 2024-11-26
Payer: MEDICARE

## 2024-11-26 VITALS — HEIGHT: 62 IN | BODY MASS INDEX: 32.02 KG/M2 | WEIGHT: 174 LBS

## 2024-11-26 DIAGNOSIS — R22.32 LOCALIZED SWELLING, MASS AND LUMP, UPPER LIMB, LEFT: ICD-10-CM

## 2024-11-26 DIAGNOSIS — R22.32 LOCALIZED SWELLING, MASS AND LUMP, UPPER LIMB, LEFT: Primary | ICD-10-CM

## 2024-11-26 LAB
BH CV UPPER VENOUS LEFT AXILLARY AUGMENT: NORMAL
BH CV UPPER VENOUS LEFT AXILLARY COLOR: 1
BH CV UPPER VENOUS LEFT AXILLARY COMPRESS: NORMAL
BH CV UPPER VENOUS LEFT AXILLARY PHASIC: NORMAL
BH CV UPPER VENOUS LEFT AXILLARY SPONT: NORMAL
BH CV UPPER VENOUS LEFT BASILIC FOREARM COLOR: 1
BH CV UPPER VENOUS LEFT BASILIC FOREARM COMPRESS: NORMAL
BH CV UPPER VENOUS LEFT BASILIC UPPER COLOR: 1
BH CV UPPER VENOUS LEFT BASILIC UPPER COMPRESS: NORMAL
BH CV UPPER VENOUS LEFT BRACHIAL COLOR: 1
BH CV UPPER VENOUS LEFT BRACHIAL COMPRESS: NORMAL
BH CV UPPER VENOUS LEFT CEPHALIC FOREARM COMPRESS: NORMAL
BH CV UPPER VENOUS LEFT CEPHALIC UPPER COMPRESS: NORMAL
BH CV UPPER VENOUS LEFT INTERNAL JUGULAR AUGMENT: NORMAL
BH CV UPPER VENOUS LEFT INTERNAL JUGULAR COMPRESS: NORMAL
BH CV UPPER VENOUS LEFT INTERNAL JUGULAR PHASIC: NORMAL
BH CV UPPER VENOUS LEFT INTERNAL JUGULAR SPONT: NORMAL
BH CV UPPER VENOUS LEFT RADIAL COMPRESS: NORMAL
BH CV UPPER VENOUS LEFT SUBCLAVIAN AUGMENT: NORMAL
BH CV UPPER VENOUS LEFT SUBCLAVIAN COMPRESS: NORMAL
BH CV UPPER VENOUS LEFT SUBCLAVIAN PHASIC: NORMAL
BH CV UPPER VENOUS LEFT SUBCLAVIAN SPONT: NORMAL
BH CV UPPER VENOUS LEFT ULNAR COLOR: 1
BH CV UPPER VENOUS LEFT ULNAR COMPRESS: NORMAL
BH CV UPPER VENOUS RIGHT SUBCLAVIAN AUGMENT: NORMAL
BH CV UPPER VENOUS RIGHT SUBCLAVIAN PHASIC: NORMAL
BH CV UPPER VENOUS RIGHT SUBCLAVIAN SPONT: NORMAL
BH CV VAS PRELIMINARY FINDINGS SCRIPTING: 1

## 2024-11-26 PROCEDURE — 93971 EXTREMITY STUDY: CPT
